# Patient Record
Sex: FEMALE | Race: BLACK OR AFRICAN AMERICAN | NOT HISPANIC OR LATINO | Employment: FULL TIME | URBAN - METROPOLITAN AREA
[De-identification: names, ages, dates, MRNs, and addresses within clinical notes are randomized per-mention and may not be internally consistent; named-entity substitution may affect disease eponyms.]

---

## 2024-03-30 NOTE — PROGRESS NOTES
"GYN ANNUAL EXAM     Chief Complaint   Patient presents with    ngyn     AE and home pregnancy positive.Wants her first pap.       ADAMARIS Bowie is a 24 y.o. female who presents for annual well woman exam. She is a new patient to our practice.     OB History    No obstetric history on file.         SUBJECTIVE    MENSTRUAL & SEXUAL HEALTH  LMP: Patient's last menstrual period was 02/28/2024.  Currently sexually active: is  Sexual preference: men  Menses regularity:  LMP 04/28/2024  Menses length:  n/a    Dysmenorrhea: n/a  Cyclic symptoms: n/a  Current contraception: none, positive pregnancy test.   Last pap: n/a,  first pap  History of abnormal pap: no  History of STD: No  Family history of cancer: denies       Family history of breast cancer: no  Performs SBE: performs monthly.  Incontinence: no  Dyspareunia: no    History reviewed. No pertinent past medical history.    History reviewed. No pertinent surgical history.    No current outpatient medications on file.    No Known Allergies    Social History     Tobacco Use    Smoking status: Never   Vaping Use    Vaping status: Never Used   Substance Use Topics    Alcohol use: Never    Drug use: Never       History reviewed. No pertinent family history.    Review of Systems   Constitutional:  Negative for fatigue, unexpected weight gain and unexpected weight loss.   Gastrointestinal:  Negative for abdominal pain.   Genitourinary:  Negative for decreased libido, difficulty urinating, dyspareunia, dysuria, pelvic pain, pelvic pressure, urgency, urinary incontinence and vaginal discharge.   All other systems reviewed and are negative.      OBJECTIVE    /73   Ht 170.2 cm (67\")   Wt 101 kg (223 lb)   LMP 02/28/2024   BMI 34.93 kg/m²     Physical Exam  Constitutional:       General: She is awake. She is not in acute distress.     Appearance: Normal appearance. She is well-developed and well-groomed. She is not ill-appearing.   Genitourinary:      Vulva, bladder and " urethral meatus normal.      Right Labia: No rash, tenderness, lesions or skin changes.     Left Labia: No tenderness, lesions, skin changes or rash.     No labial fusion noted.      No inguinal adenopathy present in the right or left side.     No vaginal discharge, erythema, tenderness, bleeding, ulceration or granulation tissue.      No vaginal prolapse present.     No vaginal atrophy present.     No cervical discharge, friability, lesion, polyp, eversion or elongation.      Uterus is not enlarged, tender or prolapsed.      Pelvic exam was performed with patient in the lithotomy position.   Breasts:     Breasts are symmetrical.      Breasts are soft.     Right: Normal.      Left: Normal.   HENT:      Head: Normocephalic and atraumatic.   Eyes:      General: No scleral icterus.     Conjunctiva/sclera: Conjunctivae normal.   Cardiovascular:      Rate and Rhythm: Normal rate and regular rhythm.      Heart sounds: Normal heart sounds.   Pulmonary:      Effort: Pulmonary effort is normal.      Breath sounds: Normal breath sounds.   Abdominal:      Palpations: Abdomen is soft.      Hernia: There is no hernia in the left inguinal area or right inguinal area.   Musculoskeletal:         General: Normal range of motion.      Cervical back: Normal range of motion.   Lymphadenopathy:      Lower Body: No right inguinal adenopathy. No left inguinal adenopathy.   Neurological:      Mental Status: She is alert.   Skin:     General: Skin is warm and dry.      Coloration: Skin is not jaundiced or pale.   Psychiatric:         Behavior: Behavior normal. Behavior is cooperative.   Vitals reviewed.         ASSESSMENT     Diagnoses and all orders for this visit:    1. Encounter for gynecological examination without abnormal finding (Primary)  -     IGP,CtNgTv,rfx Apt HPV All    2. Encounter for screening examination for sexually transmitted disease  -     IGP,CtNgTv,rfx Apt HPV All  -     NuSwab VG+ - Swab, Vagina  -     Hepatitis B  Surface Antigen  -     Hepatitis C Antibody  -     HIV-1 / O / 2 Ag / Antibody  -     T Pallidum Antibody w/ reflex RPR (Syphilis)    3. Positive pregnancy test  -     HCG, B-subunit, Quantitative  -     Progesterone         PLAN   WELL WOMAN EXAM: Pap smear collected today. Recommend MVI daily.    CONTRACEPTION:  positive pregnancy test.    POSITIVE PREGNANCY TEST: HCG & progesterone labs entered - return to care when applicable for dating ultrasound.   STD: STD screen today- desires - Serum and NuSwab., encouraged use of condoms.  SMOKING STATUS: non smoker.  BREAST HEALTH: Encouraged annual mammogram screening starting at age 40. Instructed on how to perform SBE. Encouraged breast health self awareness.  EXERCISE: Encouraged 150 minutes of exercise per week if not medially contraindicated.   BMI: Body mass index is 34.93 kg/m².     Return in about 4 weeks (around 4/29/2024) for first OB visit.    I spent 15 minutes caring for [place] on this date of service. This time includes time spent by me in the following activities: preparing for the visit, reviewing tests, obtaining and/or reviewing a separately obtained history, performing a medically appropriate examination and/or evaluation, counseling and educating the patient/family/caregiver, ordering medications, tests, or procedures, referring and communicating with other health care professionals, documenting information in the medical record, independently interpreting results and communicating that information with the patient/family/caregiver, and care coordination.    Shameka Mays CNM  4/1/2024  12:01 EDT

## 2024-04-01 ENCOUNTER — OFFICE VISIT (OUTPATIENT)
Dept: OBSTETRICS AND GYNECOLOGY | Facility: CLINIC | Age: 24
End: 2024-04-01
Payer: COMMERCIAL

## 2024-04-01 VITALS
BODY MASS INDEX: 35 KG/M2 | DIASTOLIC BLOOD PRESSURE: 73 MMHG | HEIGHT: 67 IN | WEIGHT: 223 LBS | SYSTOLIC BLOOD PRESSURE: 125 MMHG

## 2024-04-01 DIAGNOSIS — Z32.01 POSITIVE PREGNANCY TEST: ICD-10-CM

## 2024-04-01 DIAGNOSIS — Z01.419 ENCOUNTER FOR GYNECOLOGICAL EXAMINATION WITHOUT ABNORMAL FINDING: Primary | ICD-10-CM

## 2024-04-01 DIAGNOSIS — Z11.3 ENCOUNTER FOR SCREENING EXAMINATION FOR SEXUALLY TRANSMITTED DISEASE: ICD-10-CM

## 2024-04-01 PROCEDURE — 99385 PREV VISIT NEW AGE 18-39: CPT

## 2024-04-01 RX ORDER — PRENATAL VIT/IRON FUM/FOLIC AC 27 MG-1 MG
1 TABLET ORAL DAILY
Qty: 30 EACH | Refills: 10 | Status: SHIPPED | OUTPATIENT
Start: 2024-04-01

## 2024-04-02 ENCOUNTER — TELEPHONE (OUTPATIENT)
Dept: OBSTETRICS AND GYNECOLOGY | Facility: CLINIC | Age: 24
End: 2024-04-02
Payer: COMMERCIAL

## 2024-04-02 LAB
HBV SURFACE AG SERPL QL IA: NEGATIVE
HCG INTACT+B SERPL-ACNC: 9875 MIU/ML
HCV IGG SERPL QL IA: NON REACTIVE
HIV 1+2 AB+HIV1 P24 AG SERPL QL IA: NON REACTIVE
PROGEST SERPL-MCNC: 15.9 NG/ML
T PALLIDUM AB SER QL IF: NON REACTIVE

## 2024-04-02 NOTE — TELEPHONE ENCOUNTER
Shameka,    Patient requesting a phone call to further discuss her test results from yesterday.    Thanks!

## 2024-04-03 ENCOUNTER — TELEPHONE (OUTPATIENT)
Dept: OBSTETRICS AND GYNECOLOGY | Facility: CLINIC | Age: 24
End: 2024-04-03
Payer: COMMERCIAL

## 2024-04-03 LAB
A VAGINAE DNA VAG QL NAA+PROBE: NORMAL SCORE
BVAB2 DNA VAG QL NAA+PROBE: NORMAL SCORE
C ALBICANS DNA VAG QL NAA+PROBE: NEGATIVE
C GLABRATA DNA VAG QL NAA+PROBE: NEGATIVE
C TRACH DNA VAG QL NAA+PROBE: NEGATIVE
MEGA1 DNA VAG QL NAA+PROBE: NORMAL SCORE
N GONORRHOEA DNA VAG QL NAA+PROBE: NEGATIVE
T VAGINALIS DNA VAG QL NAA+PROBE: NEGATIVE

## 2024-04-03 NOTE — TELEPHONE ENCOUNTER
New Ob called to report she SW you yesterday via [employer], and you advised her to call the office to schedule u/s and visit with you.  I do not see documentation to support that, and no u/s ordered.     Pt is scheduled for New Ob visit w/Dr. Verma on 4/29/24.    Please advise.     Thanks,   Mansi

## 2024-04-04 LAB
C TRACH RRNA CVX QL NAA+PROBE: NEGATIVE
CONV .: NORMAL
CYTOLOGIST CVX/VAG CYTO: NORMAL
CYTOLOGY CVX/VAG DOC CYTO: NORMAL
CYTOLOGY CVX/VAG DOC THIN PREP: NORMAL
DX ICD CODE: NORMAL
Lab: NORMAL
N GONORRHOEA RRNA CVX QL NAA+PROBE: NEGATIVE
OTHER STN SPEC: NORMAL
STAT OF ADQ CVX/VAG CYTO-IMP: NORMAL
T VAGINALIS RRNA SPEC QL NAA+PROBE: NEGATIVE

## 2024-04-08 ENCOUNTER — PATIENT MESSAGE (OUTPATIENT)
Dept: OBSTETRICS AND GYNECOLOGY | Facility: CLINIC | Age: 24
End: 2024-04-08
Payer: COMMERCIAL

## 2024-04-08 ENCOUNTER — PATIENT ROUNDING (BHMG ONLY) (OUTPATIENT)
Dept: OBSTETRICS AND GYNECOLOGY | Facility: CLINIC | Age: 24
End: 2024-04-08
Payer: COMMERCIAL

## 2024-04-08 NOTE — PROGRESS NOTES
My chart message has been sent to the patient for PATIENT ROUNDING with [facility].

## 2024-04-29 ENCOUNTER — INITIAL PRENATAL (OUTPATIENT)
Dept: OBSTETRICS AND GYNECOLOGY | Facility: CLINIC | Age: 24
End: 2024-04-29
Payer: COMMERCIAL

## 2024-04-29 VITALS — BODY MASS INDEX: 34.17 KG/M2 | SYSTOLIC BLOOD PRESSURE: 120 MMHG | WEIGHT: 218.2 LBS | DIASTOLIC BLOOD PRESSURE: 77 MMHG

## 2024-04-29 DIAGNOSIS — Z34.90 PREGNANCY WITH UNCERTAIN DATES, ANTEPARTUM: ICD-10-CM

## 2024-04-29 DIAGNOSIS — Z13.89 SCREENING FOR BLOOD OR PROTEIN IN URINE: ICD-10-CM

## 2024-04-29 DIAGNOSIS — Z34.90 EARLY STAGE OF PREGNANCY: Primary | ICD-10-CM

## 2024-04-29 LAB
GLUCOSE UR STRIP-MCNC: NEGATIVE MG/DL
PROT UR STRIP-MCNC: NEGATIVE MG/DL

## 2024-04-29 PROCEDURE — 0501F PRENATAL FLOW SHEET: CPT | Performed by: OBSTETRICS & GYNECOLOGY

## 2024-04-29 NOTE — PROGRESS NOTES
Chief Complaint   Patient presents with    Initial Prenatal Visit     HPI- Pt is 24 y.o.  at 8w5d here for prenatal visit.  Patient presents for initial OB visit and states she is sure regarding her LMP.  She has no major underlying medical problems and is taking prenatal vitamins.  Her only complaint today is that she has had dizziness that is noticeable after standing for long periods of time.  She is only had 1 episode of vomiting.    ROS-     - No vaginal bleeding    GI- No abdominal pain    /77   Wt 99 kg (218 lb 3.2 oz)   LMP 2024   BMI 34.17 kg/m²   Exam - See flow sheet    Fetal heart rate is normal    Assessment-  Diagnoses and all orders for this visit:    Early stage of pregnancy  -     OB Panel With HIV  -     Urine Culture - Urine, Urine, Clean Catch  -     Hemoglobinopathy Fractionation [place]    Pregnancy with uncertain dates, antepartum  -     US Ob Transvaginal; Future    Discussed physiological changes that occur in pregnancy that contribute to dizziness.  Advised to maintain hydration and also discussed compression socks.  Patient had a normal Pap smear less than 1 month ago in our office.  OB labs and ultrasound were ordered.  I discussed delivering hospital and timing of prenatal visits.  She will follow-up in 4 weeks.

## 2024-04-30 ENCOUNTER — TELEPHONE (OUTPATIENT)
Dept: OBSTETRICS AND GYNECOLOGY | Facility: CLINIC | Age: 24
End: 2024-04-30
Payer: COMMERCIAL

## 2024-04-30 PROBLEM — O99.019 MATERNAL ANEMIA IN PREGNANCY, ANTEPARTUM: Status: ACTIVE | Noted: 2024-04-30

## 2024-04-30 LAB
ABO GROUP BLD: ABNORMAL
BASOPHILS # BLD AUTO: 0 X10E3/UL (ref 0–0.2)
BASOPHILS NFR BLD AUTO: 0 %
BLD GP AB SCN SERPL QL: NEGATIVE
EOSINOPHIL # BLD AUTO: 0.1 X10E3/UL (ref 0–0.4)
EOSINOPHIL NFR BLD AUTO: 1 %
ERYTHROCYTE [DISTWIDTH] IN BLOOD BY AUTOMATED COUNT: 16.4 % (ref 11.7–15.4)
HBV SURFACE AG SERPL QL IA: NEGATIVE
HCT VFR BLD AUTO: 34.5 % (ref 34–46.6)
HCV IGG SERPL QL IA: NON REACTIVE
HGB A MFR BLD ELPH: 98 % (ref 96.4–98.8)
HGB A2 MFR BLD ELPH: 2 % (ref 1.8–3.2)
HGB BLD-MCNC: 10.4 G/DL (ref 11.1–15.9)
HGB F MFR BLD ELPH: 0 % (ref 0–2)
HGB FRACT BLD-IMP: NORMAL
HGB S MFR BLD ELPH: 0 %
HIV 1+2 AB+HIV1 P24 AG SERPL QL IA: NON REACTIVE
IMM GRANULOCYTES # BLD AUTO: 0 X10E3/UL (ref 0–0.1)
IMM GRANULOCYTES NFR BLD AUTO: 0 %
LYMPHOCYTES # BLD AUTO: 2.2 X10E3/UL (ref 0.7–3.1)
LYMPHOCYTES NFR BLD AUTO: 20 %
MCH RBC QN AUTO: 22.7 PG (ref 26.6–33)
MCHC RBC AUTO-ENTMCNC: 30.1 G/DL (ref 31.5–35.7)
MCV RBC AUTO: 75 FL (ref 79–97)
MONOCYTES # BLD AUTO: 0.6 X10E3/UL (ref 0.1–0.9)
MONOCYTES NFR BLD AUTO: 5 %
NEUTROPHILS # BLD AUTO: 8 X10E3/UL (ref 1.4–7)
NEUTROPHILS NFR BLD AUTO: 74 %
PLATELET # BLD AUTO: 303 X10E3/UL (ref 150–450)
RBC # BLD AUTO: 4.58 X10E6/UL (ref 3.77–5.28)
RH BLD: POSITIVE
RPR SER QL: NON REACTIVE
RUBV IGG SERPL IA-ACNC: 2.56 INDEX
WBC # BLD AUTO: 10.9 X10E3/UL (ref 3.4–10.8)

## 2024-04-30 RX ORDER — FERROUS SULFATE 325(65) MG
325 TABLET ORAL
Qty: 90 TABLET | Refills: 1 | Status: SHIPPED | OUTPATIENT
Start: 2024-04-30

## 2024-04-30 NOTE — TELEPHONE ENCOUNTER
----- Message from Molina CHAMBERLAIN sent at 4/30/2024  9:42 AM EDT -----  Let patient know that her CBC does show anemia and I have sent a prescription for iron to her pharmacy.

## 2024-05-01 LAB
BACTERIA UR CULT: NORMAL
BACTERIA UR CULT: NORMAL

## 2024-05-13 DIAGNOSIS — Z36.0 ENCOUNTER FOR ANTENATAL SCREENING FOR CHROMOSOMAL ANOMALIES: Primary | ICD-10-CM

## 2024-05-18 LAB
CFDNA.FET/CFDNA.TOTAL SFR FETUS: NORMAL %
CITATION REF LAB TEST: NORMAL
FET 13+18+21+X+Y ANEUP PLAS.CFDNA: NEGATIVE
FET CHR 21 TS PLAS.CFDNA QL: NEGATIVE
FET SEX PLAS.CFDNA DOSAGE CFDNA: NORMAL
FET TS 13 RISK PLAS.CFDNA QL: NEGATIVE
FET TS 18 RISK WBC.DNA+CFDNA QL: NEGATIVE
GA EST FROM CONCEPTION DATE: NORMAL D
GESTATIONAL AGE > 9:: YES
LAB DIRECTOR NAME PROVIDER: NORMAL
LAB DIRECTOR NAME PROVIDER: NORMAL
LABORATORY COMMENT REPORT: NORMAL
LIMITATIONS OF THE TEST: NORMAL
NEGATIVE PREDICTIVE VALUE: NORMAL
NOTE: NORMAL
PERFORMANCE CHARACTERISTICS: NORMAL
POSITIVE PREDICTIVE VALUE: NORMAL
REF LAB TEST METHOD: NORMAL
TEST PERFORMANCE INFO SPEC: NORMAL

## 2024-05-20 ENCOUNTER — TELEPHONE (OUTPATIENT)
Dept: OBSTETRICS AND GYNECOLOGY | Facility: CLINIC | Age: 24
End: 2024-05-20
Payer: COMMERCIAL

## 2024-05-20 NOTE — TELEPHONE ENCOUNTER
Left vm to call for results    Please let patient know that her genetic testing was normal, and if she wants to know gender, it showed a male fetus

## 2024-05-29 ENCOUNTER — ROUTINE PRENATAL (OUTPATIENT)
Dept: OBSTETRICS AND GYNECOLOGY | Facility: CLINIC | Age: 24
End: 2024-05-29
Payer: COMMERCIAL

## 2024-05-29 VITALS — BODY MASS INDEX: 33.67 KG/M2 | DIASTOLIC BLOOD PRESSURE: 72 MMHG | SYSTOLIC BLOOD PRESSURE: 113 MMHG | WEIGHT: 215 LBS

## 2024-05-29 DIAGNOSIS — Z3A.13 13 WEEKS GESTATION OF PREGNANCY: Primary | ICD-10-CM

## 2024-05-29 DIAGNOSIS — Z34.92 PRENATAL CARE IN SECOND TRIMESTER: ICD-10-CM

## 2024-05-29 DIAGNOSIS — O21.9 NAUSEA AND VOMITING DURING PREGNANCY: ICD-10-CM

## 2024-05-29 DIAGNOSIS — O99.019 MATERNAL ANEMIA IN PREGNANCY, ANTEPARTUM: ICD-10-CM

## 2024-05-29 LAB
GLUCOSE UR STRIP-MCNC: NEGATIVE MG/DL
PROT UR STRIP-MCNC: NEGATIVE MG/DL

## 2024-05-29 PROCEDURE — 0502F SUBSEQUENT PRENATAL CARE: CPT | Performed by: NURSE PRACTITIONER

## 2024-05-29 NOTE — PROGRESS NOTES
Chief Complaint   Patient presents with    Routine Prenatal Visit       OB follow up     Azeb Keller is a 24 y.o.  13w6d being seen today for her obstetrical visit.  Patient reports fatigue. She is still having some N&V, last emesis was one week ago. Fetal movement:  too early .    Review of Systems  Cramping/contractions: denies  Vaginal bleeding: denies  Fetal movement: too early    /72   Wt 97.5 kg (215 lb)   LMP 2024   BMI 33.67 kg/m²     Assessment/Plan    Diagnoses and all orders for this visit:    1. 13 weeks gestation of pregnancy (Primary)    2. Prenatal care in second trimester    3. Maternal anemia in pregnancy, antepartum    4. Nausea and vomiting during pregnancy        Reviewed quickening  Reviewed normal [id number] screening  Anemia: continue iron supplement  N&V: improving, weight stable. Declines medications at this time  Call if unable to keep fluids down >6 hours  Reviewed this stage of pregnancy  Problem list updated     Follow up in 4 week(s) with Dr. Verma    I spent 22 minutes caring for Azeb on this date of service. This time includes time spent by me in the following activities: preparing for the visit, reviewing tests, obtaining and/or reviewing a separately obtained history, performing a medically appropriate examination and/or evaluation, counseling and educating the patient/family/caregiver, ordering medications, tests, or procedures, referring and communicating with other health care professionals, documenting information in the medical record, and independently interpreting results and communicating that information with the patient/family/caregiver    Kymberly Feliciano, APRN  2024  10:55 EDT

## 2024-06-24 ENCOUNTER — ROUTINE PRENATAL (OUTPATIENT)
Dept: OBSTETRICS AND GYNECOLOGY | Facility: CLINIC | Age: 24
End: 2024-06-24
Payer: COMMERCIAL

## 2024-06-24 VITALS — DIASTOLIC BLOOD PRESSURE: 71 MMHG | BODY MASS INDEX: 33.52 KG/M2 | WEIGHT: 214 LBS | SYSTOLIC BLOOD PRESSURE: 118 MMHG

## 2024-06-24 DIAGNOSIS — Z36.89 ENCOUNTER FOR FETAL ANATOMIC SURVEY: ICD-10-CM

## 2024-06-24 DIAGNOSIS — O99.019 MATERNAL ANEMIA IN PREGNANCY, ANTEPARTUM: Primary | ICD-10-CM

## 2024-06-24 DIAGNOSIS — Z36.86 ENCOUNTER FOR ANTENATAL SCREENING FOR CERVICAL LENGTH: ICD-10-CM

## 2024-06-24 DIAGNOSIS — Z13.89 SCREENING FOR BLOOD OR PROTEIN IN URINE: ICD-10-CM

## 2024-06-24 LAB
GLUCOSE UR STRIP-MCNC: NEGATIVE MG/DL
PROT UR STRIP-MCNC: ABNORMAL MG/DL

## 2024-06-24 PROCEDURE — 0502F SUBSEQUENT PRENATAL CARE: CPT | Performed by: OBSTETRICS & GYNECOLOGY

## 2024-06-24 NOTE — PROGRESS NOTES
Chief Complaint   Patient presents with    Routine Prenatal Visit     HPI- Pt is 24 y.o.  at 17w4d here for prenatal visit.  She is denying any complaints today.    ROS-     - No vaginal bleeding    GI- No abdominal pain    /71   Wt 97.1 kg (214 lb)   LMP 2024   BMI 33.52 kg/m²   Exam - See flow sheet    Fetal heart rate is normal    Assessment-  Diagnoses and all orders for this visit:    Maternal anemia in pregnancy, antepartum    Screening for blood or protein in urine  -     POC Urinalysis Dipstick    Encounter for fetal anatomic survey  -     US Ob 14 + Weeks Single or First Gestation; Future    Encounter for  screening for cervical length  -     US Ob Transvaginal; Future      Patient was offered screening for spina bifida and declines.  She did have normal cell free DNA testing.  She will follow-up in 3 weeks with anatomy ultrasound.

## 2024-07-15 ENCOUNTER — ROUTINE PRENATAL (OUTPATIENT)
Dept: OBSTETRICS AND GYNECOLOGY | Facility: CLINIC | Age: 24
End: 2024-07-15
Payer: COMMERCIAL

## 2024-07-15 VITALS — DIASTOLIC BLOOD PRESSURE: 70 MMHG | WEIGHT: 218.6 LBS | BODY MASS INDEX: 34.24 KG/M2 | SYSTOLIC BLOOD PRESSURE: 116 MMHG

## 2024-07-15 DIAGNOSIS — Z3A.20 20 WEEKS GESTATION OF PREGNANCY: ICD-10-CM

## 2024-07-15 DIAGNOSIS — Z13.89 SCREENING FOR BLOOD OR PROTEIN IN URINE: ICD-10-CM

## 2024-07-15 DIAGNOSIS — O99.019 MATERNAL ANEMIA IN PREGNANCY, ANTEPARTUM: Primary | ICD-10-CM

## 2024-07-15 DIAGNOSIS — Z36.2 ENCOUNTER FOR FOLLOW-UP ULTRASOUND OF FETAL ANATOMY: ICD-10-CM

## 2024-07-15 LAB
GLUCOSE UR STRIP-MCNC: NEGATIVE MG/DL
PROT UR STRIP-MCNC: NEGATIVE MG/DL

## 2024-07-15 PROCEDURE — 0502F SUBSEQUENT PRENATAL CARE: CPT | Performed by: OBSTETRICS & GYNECOLOGY

## 2024-07-16 LAB
ERYTHROCYTE [DISTWIDTH] IN BLOOD BY AUTOMATED COUNT: 17.8 % (ref 12.3–15.4)
HCT VFR BLD AUTO: 34.1 % (ref 34–46.6)
HGB BLD-MCNC: 10.4 G/DL (ref 12–15.9)
MCH RBC QN AUTO: 25.2 PG (ref 26.6–33)
MCHC RBC AUTO-ENTMCNC: 30.5 G/DL (ref 31.5–35.7)
MCV RBC AUTO: 82.8 FL (ref 79–97)
PLATELET # BLD AUTO: 243 10*3/MM3 (ref 140–450)
RBC # BLD AUTO: 4.12 10*6/MM3 (ref 3.77–5.28)
WBC # BLD AUTO: 10.72 10*3/MM3 (ref 3.4–10.8)

## 2024-08-19 ENCOUNTER — ROUTINE PRENATAL (OUTPATIENT)
Dept: OBSTETRICS AND GYNECOLOGY | Facility: CLINIC | Age: 24
End: 2024-08-19
Payer: COMMERCIAL

## 2024-08-19 VITALS — BODY MASS INDEX: 36.18 KG/M2 | WEIGHT: 231 LBS | SYSTOLIC BLOOD PRESSURE: 130 MMHG | DIASTOLIC BLOOD PRESSURE: 77 MMHG

## 2024-08-19 DIAGNOSIS — Z11.3 SCREEN FOR STD (SEXUALLY TRANSMITTED DISEASE): ICD-10-CM

## 2024-08-19 DIAGNOSIS — Z13.1 SCREENING FOR DIABETES MELLITUS: ICD-10-CM

## 2024-08-19 DIAGNOSIS — O99.019 MATERNAL ANEMIA IN PREGNANCY, ANTEPARTUM: Primary | ICD-10-CM

## 2024-08-19 DIAGNOSIS — Z3A.25 25 WEEKS GESTATION OF PREGNANCY: ICD-10-CM

## 2024-08-19 PROCEDURE — 0502F SUBSEQUENT PRENATAL CARE: CPT | Performed by: OBSTETRICS & GYNECOLOGY

## 2024-08-19 NOTE — PROGRESS NOTES
Chief Complaint   Patient presents with    Routine Prenatal Visit     HPI- Pt is 24 y.o.  at 25w4d here for prenatal visit.  She is doing well with no major complaints and feels good fetal movement.     ROS-     - No vaginal bleeding    GI- No abdominal pain    /77   Wt 105 kg (231 lb)   LMP 2024   BMI 36.18 kg/m²   Exam - See flow sheet    Fetal heart rate is normal    Assessment-  Diagnoses and all orders for this visit:    Maternal anemia in pregnancy, antepartum  -     CBC (No Diff)    25 weeks gestation of pregnancy    Screening for diabetes mellitus  -     Gestational Screen 1 Hr ([facility])    Screen for STD (sexually transmitted disease)  -     Treponema pallidum AB w/Reflex RPR    Reviewed followup anatomy ultrasound.  Anatomy was normal.  Discussed glucose test with her next visit in 3 weeks.

## 2024-09-09 ENCOUNTER — ROUTINE PRENATAL (OUTPATIENT)
Dept: OBSTETRICS AND GYNECOLOGY | Facility: CLINIC | Age: 24
End: 2024-09-09
Payer: COMMERCIAL

## 2024-09-09 VITALS — BODY MASS INDEX: 36.9 KG/M2 | SYSTOLIC BLOOD PRESSURE: 126 MMHG | DIASTOLIC BLOOD PRESSURE: 75 MMHG | WEIGHT: 235.6 LBS

## 2024-09-09 DIAGNOSIS — Z3A.25 25 WEEKS GESTATION OF PREGNANCY: ICD-10-CM

## 2024-09-09 DIAGNOSIS — O99.019 MATERNAL ANEMIA IN PREGNANCY, ANTEPARTUM: Primary | ICD-10-CM

## 2024-09-09 PROCEDURE — 0502F SUBSEQUENT PRENATAL CARE: CPT | Performed by: OBSTETRICS & GYNECOLOGY

## 2024-09-09 NOTE — PROGRESS NOTES
Chief Complaint   Patient presents with    Routine Prenatal Visit     HPI- Pt is 24 y.o.  at 28w4d here for prenatal visit.  She is doing well and has no complaints.  She reports lots of fetal movement.    ROS-     - No vaginal bleeding    GI- No abdominal pain    /75   Wt 107 kg (235 lb 9.6 oz)   LMP 2024   BMI 36.90 kg/m²   Exam - See flow sheet    Fetal heart rate is normal    Assessment-  Diagnoses and all orders for this visit:    Maternal anemia in pregnancy, antepartum    25 weeks gestation of pregnancy    She was counseled on fetal kick counts in the third trimester.  She states she is unable to stay today to complete her 1 hour glucose test and will return another day this week.  She will follow-up with me in 2 weeks.

## 2024-09-14 LAB
ERYTHROCYTE [DISTWIDTH] IN BLOOD BY AUTOMATED COUNT: 14.4 % (ref 11.7–15.4)
GLUCOSE 1H P 50 G GLC PO SERPL-MCNC: 114 MG/DL (ref 70–139)
HCT VFR BLD AUTO: 35.9 % (ref 34–46.6)
HGB BLD-MCNC: 11.3 G/DL (ref 11.1–15.9)
MCH RBC QN AUTO: 26.9 PG (ref 26.6–33)
MCHC RBC AUTO-ENTMCNC: 31.5 G/DL (ref 31.5–35.7)
MCV RBC AUTO: 86 FL (ref 79–97)
PLATELET # BLD AUTO: 219 X10E3/UL (ref 150–450)
RBC # BLD AUTO: 4.2 X10E6/UL (ref 3.77–5.28)
WBC # BLD AUTO: 10.4 X10E3/UL (ref 3.4–10.8)

## 2024-09-16 LAB — TREPONEMA PALLIDUM IGG+IGM AB [PRESENCE] IN SERUM OR PLASMA BY IMMUNOASSAY: NON REACTIVE

## 2024-09-23 ENCOUNTER — ROUTINE PRENATAL (OUTPATIENT)
Dept: OBSTETRICS AND GYNECOLOGY | Facility: CLINIC | Age: 24
End: 2024-09-23
Payer: COMMERCIAL

## 2024-09-23 ENCOUNTER — DOCUMENTATION (OUTPATIENT)
Dept: OBSTETRICS AND GYNECOLOGY | Facility: CLINIC | Age: 24
End: 2024-09-23

## 2024-09-23 VITALS — WEIGHT: 238.5 LBS | DIASTOLIC BLOOD PRESSURE: 73 MMHG | SYSTOLIC BLOOD PRESSURE: 120 MMHG | BODY MASS INDEX: 37.35 KG/M2

## 2024-09-23 DIAGNOSIS — Z13.89 SCREENING FOR BLOOD OR PROTEIN IN URINE: ICD-10-CM

## 2024-09-23 DIAGNOSIS — Z23 NEED FOR TDAP VACCINATION: ICD-10-CM

## 2024-09-23 DIAGNOSIS — Z29.11 NEED FOR RSV IMMUNIZATION: ICD-10-CM

## 2024-09-23 DIAGNOSIS — Z23 INFLUENZA VACCINATION GIVEN: ICD-10-CM

## 2024-09-23 DIAGNOSIS — O99.019 MATERNAL ANEMIA IN PREGNANCY, ANTEPARTUM: Primary | ICD-10-CM

## 2024-09-23 DIAGNOSIS — Z3A.30 30 WEEKS GESTATION OF PREGNANCY: ICD-10-CM

## 2024-09-23 LAB
GLUCOSE UR STRIP-MCNC: NEGATIVE MG/DL
PROT UR STRIP-MCNC: NEGATIVE MG/DL

## 2024-09-23 PROCEDURE — 90472 IMMUNIZATION ADMIN EACH ADD: CPT | Performed by: OBSTETRICS & GYNECOLOGY

## 2024-09-23 PROCEDURE — 0502F SUBSEQUENT PRENATAL CARE: CPT | Performed by: OBSTETRICS & GYNECOLOGY

## 2024-09-23 PROCEDURE — 90471 IMMUNIZATION ADMIN: CPT | Performed by: OBSTETRICS & GYNECOLOGY

## 2024-09-23 PROCEDURE — 90678 RSV VACC PREF BIVALENT IM: CPT | Performed by: OBSTETRICS & GYNECOLOGY

## 2024-09-23 PROCEDURE — 90656 IIV3 VACC NO PRSV 0.5 ML IM: CPT | Performed by: OBSTETRICS & GYNECOLOGY

## 2024-10-07 ENCOUNTER — ROUTINE PRENATAL (OUTPATIENT)
Dept: OBSTETRICS AND GYNECOLOGY | Facility: CLINIC | Age: 24
End: 2024-10-07
Payer: COMMERCIAL

## 2024-10-07 VITALS — DIASTOLIC BLOOD PRESSURE: 79 MMHG | BODY MASS INDEX: 37.15 KG/M2 | WEIGHT: 237.2 LBS | SYSTOLIC BLOOD PRESSURE: 139 MMHG

## 2024-10-07 DIAGNOSIS — Z3A.32 32 WEEKS GESTATION OF PREGNANCY: ICD-10-CM

## 2024-10-07 DIAGNOSIS — O99.019 MATERNAL ANEMIA IN PREGNANCY, ANTEPARTUM: Primary | ICD-10-CM

## 2024-10-07 DIAGNOSIS — Z23 INFLUENZA VACCINATION GIVEN: ICD-10-CM

## 2024-10-07 DIAGNOSIS — Z13.89 SCREENING FOR BLOOD OR PROTEIN IN URINE: ICD-10-CM

## 2024-10-07 DIAGNOSIS — N89.8 VAGINAL IRRITATION: ICD-10-CM

## 2024-10-07 LAB
GLUCOSE UR STRIP-MCNC: NEGATIVE MG/DL
PROT UR STRIP-MCNC: ABNORMAL MG/DL

## 2024-10-07 PROCEDURE — 0502F SUBSEQUENT PRENATAL CARE: CPT | Performed by: OBSTETRICS & GYNECOLOGY

## 2024-10-07 NOTE — PROGRESS NOTES
Chief Complaint   Patient presents with    Routine Prenatal Visit     HPI- Pt is 24 y.o.  at 32w4d here for prenatal visit.  Patient reports vaginal irritation that she notices only after intercourse.  She does report good fetal movement.    ROS-     - No vaginal bleeding    GI- No abdominal pain    /79   Wt 108 kg (237 lb 3.2 oz)   LMP 2024   BMI 37.15 kg/m²   Exam - See flow sheet    Fetal heart rate is normal    Assessment-  Diagnoses and all orders for this visit:    Maternal anemia in pregnancy, antepartum    Screening for blood or protein in urine  -     POC Urinalysis Dipstick    Vaginal irritation  -     NuSwab VG+ - Swab, Vagina    32 weeks gestation of pregnancy    Influenza vaccination given    I was notified by medical assistant that the patient needed flu vaccine at this visit after she was mistakenly given RSV vaccine at the last visit instead of the flu vaccine.  After the medical assistant gave the flu vaccine, it was noted in her chart that she actually did receive the flu vaccine and RSV vaccine at the last visit.  Patient was made aware of this mistake and she will receive Tdap at her next visit in 2 weeks.  Reviewed growth ultrasound with her and ultrasound shows appropriate fetal growth.  She will follow-up in 2 weeks.

## 2024-10-08 LAB
A VAGINAE DNA VAG QL NAA+PROBE: NORMAL SCORE
BVAB2 DNA VAG QL NAA+PROBE: NORMAL SCORE
C ALBICANS DNA VAG QL NAA+PROBE: NEGATIVE
C GLABRATA DNA VAG QL NAA+PROBE: NEGATIVE
C TRACH DNA SPEC QL NAA+PROBE: NEGATIVE
MEGA1 DNA VAG QL NAA+PROBE: NORMAL SCORE
N GONORRHOEA DNA VAG QL NAA+PROBE: NEGATIVE
T VAGINALIS DNA VAG QL NAA+PROBE: NEGATIVE

## 2024-10-10 NOTE — PROGRESS NOTES
Chief Complaint   Patient presents with    Routine Prenatal Visit     HPI- Pt is 24 y.o.  at 20w4d here for prenatal visit.  She is doing well and has no major complaints.  She is feeling fetal movement.  She would like to have her CBC repeated today to see if she could stop the ferrous sulfate for a period of time.    ROS-     - No vaginal bleeding    GI- No abdominal pain    /70   Wt 99.2 kg (218 lb 9.6 oz)   LMP 2024   BMI 34.24 kg/m²   Exam - See flow sheet    Fetal heart rate is normal    Assessment-  Diagnoses and all orders for this visit:    Maternal anemia in pregnancy, antepartum  -     CBC (No Diff)    Encounter for follow-up ultrasound of fetal anatomy  -     US Ob Follow Up Transabdominal Approach; Future    20 weeks gestation of pregnancy    We will repeat CBC today.  Anatomy ultrasound was reviewed with her and fetal face could not be well-visualized.  I discussed recommendations to repeat ultrasound in 4 weeks, but patient desires to return in 2 weeks.  She will follow-up in 2 weeks for ultrasound and see me back in 4 weeks.       10-Oct-2024 15:47

## 2024-10-21 ENCOUNTER — ROUTINE PRENATAL (OUTPATIENT)
Dept: OBSTETRICS AND GYNECOLOGY | Facility: CLINIC | Age: 24
End: 2024-10-21
Payer: COMMERCIAL

## 2024-10-21 VITALS — BODY MASS INDEX: 38.31 KG/M2 | DIASTOLIC BLOOD PRESSURE: 80 MMHG | SYSTOLIC BLOOD PRESSURE: 132 MMHG | WEIGHT: 244.6 LBS

## 2024-10-21 DIAGNOSIS — O99.019 MATERNAL ANEMIA IN PREGNANCY, ANTEPARTUM: ICD-10-CM

## 2024-10-21 DIAGNOSIS — O36.8130 DECREASED FETAL MOVEMENTS IN THIRD TRIMESTER, SINGLE OR UNSPECIFIED FETUS: Primary | ICD-10-CM

## 2024-10-21 DIAGNOSIS — Z13.89 SCREENING FOR BLOOD OR PROTEIN IN URINE: ICD-10-CM

## 2024-10-21 DIAGNOSIS — Z3A.34 34 WEEKS GESTATION OF PREGNANCY: ICD-10-CM

## 2024-10-21 DIAGNOSIS — Z23 NEED FOR TDAP VACCINATION: ICD-10-CM

## 2024-10-21 LAB
GLUCOSE UR STRIP-MCNC: NEGATIVE MG/DL
PROT UR STRIP-MCNC: ABNORMAL MG/DL

## 2024-10-21 PROCEDURE — 0502F SUBSEQUENT PRENATAL CARE: CPT | Performed by: OBSTETRICS & GYNECOLOGY

## 2024-10-21 PROCEDURE — 90715 TDAP VACCINE 7 YRS/> IM: CPT | Performed by: OBSTETRICS & GYNECOLOGY

## 2024-10-21 PROCEDURE — 90471 IMMUNIZATION ADMIN: CPT | Performed by: OBSTETRICS & GYNECOLOGY

## 2024-10-21 NOTE — PROGRESS NOTES
Chief Complaint   Patient presents with    Routine Prenatal Visit     HPI- Pt is 24 y.o.  at 34w4d here for prenatal visit.  Patient does feel that she is not feeling as much fetal movement as she has been and is unsure if she is feeling 10 movements over 2 hours.  She does report more pelvic pressure.    ROS-     - No vaginal bleeding    GI- No abdominal pain    /80   Wt 111 kg (244 lb 9.6 oz)   LMP 2024   BMI 38.31 kg/m²   Exam - See flow sheet    Fetal heart rate is normal    Assessment-  Diagnoses and all orders for this visit:    Decreased fetal movements in third trimester, single or unspecified fetus  -     US Fetal Biophysical Profile;Without Non-Stress Testing; Future    Need for Tdap vaccination    Maternal anemia in pregnancy, antepartum    34 weeks gestation of pregnancy    She will receive the Tdap vaccine today.  We will check a BPP due to complaints of decreased fetal movement, but I was able to see a lot of active fetal movement while listening to fetal heart tones.  She will follow-up in 2 weeks and discussed cervical check with that visit.

## 2024-11-06 ENCOUNTER — ROUTINE PRENATAL (OUTPATIENT)
Dept: OBSTETRICS AND GYNECOLOGY | Facility: CLINIC | Age: 24
End: 2024-11-06
Payer: COMMERCIAL

## 2024-11-06 VITALS — BODY MASS INDEX: 38.69 KG/M2 | WEIGHT: 247 LBS | DIASTOLIC BLOOD PRESSURE: 83 MMHG | SYSTOLIC BLOOD PRESSURE: 130 MMHG

## 2024-11-06 DIAGNOSIS — O99.019 MATERNAL ANEMIA IN PREGNANCY, ANTEPARTUM: Primary | ICD-10-CM

## 2024-11-06 DIAGNOSIS — Z13.89 SCREENING FOR BLOOD OR PROTEIN IN URINE: ICD-10-CM

## 2024-11-06 DIAGNOSIS — Z3A.36 36 WEEKS GESTATION OF PREGNANCY: ICD-10-CM

## 2024-11-06 DIAGNOSIS — Z36.85 ANTENATAL SCREENING FOR STREPTOCOCCUS B: ICD-10-CM

## 2024-11-06 LAB
GLUCOSE UR STRIP-MCNC: NEGATIVE MG/DL
PROT UR STRIP-MCNC: NEGATIVE MG/DL

## 2024-11-06 NOTE — PROGRESS NOTES
Chief Complaint   Patient presents with    Routine Prenatal Visit     HPI- Pt is 24 y.o.  at 36w6d here for prenatal visit.  She is overall doing well with no major complaints.  She does report good fetal movement.    ROS-     - No vaginal bleeding    GI- No abdominal pain    /83   Wt 112 kg (247 lb)   LMP 2024   BMI 38.69 kg/m²   Exam - See flow sheet    Fetal heart rate is normal    Assessment-  Diagnoses and all orders for this visit:    Maternal anemia in pregnancy, antepartum     screening for streptococcus B  -     Group B Streptococcus Culture - Swab, Vaginal/Rectum    36 weeks gestation of pregnancy    Discussed labor precautions and when to go to labor and delivery.  GBS culture was obtained.  She will follow-up weekly.

## 2024-11-10 LAB — B-HEM STREP SPEC QL CULT: NEGATIVE

## 2024-11-12 ENCOUNTER — ROUTINE PRENATAL (OUTPATIENT)
Dept: OBSTETRICS AND GYNECOLOGY | Facility: CLINIC | Age: 24
End: 2024-11-12
Payer: COMMERCIAL

## 2024-11-12 VITALS — DIASTOLIC BLOOD PRESSURE: 68 MMHG | WEIGHT: 250 LBS | SYSTOLIC BLOOD PRESSURE: 122 MMHG | BODY MASS INDEX: 39.16 KG/M2

## 2024-11-12 DIAGNOSIS — Z3A.37 37 WEEKS GESTATION OF PREGNANCY: ICD-10-CM

## 2024-11-12 DIAGNOSIS — O99.019 MATERNAL ANEMIA IN PREGNANCY, ANTEPARTUM: Primary | ICD-10-CM

## 2024-11-12 NOTE — PROGRESS NOTES
Chief Complaint   Patient presents with    Routine Prenatal Visit     HPI- Pt is 24 y.o.  at 37w5d here for prenatal visit.  She is doing well with no complaints.  She reports good fetal movement.  She denies any contractions or leaking of fluid.    ROS-     - No vaginal bleeding    GI- No abdominal pain    /68   Wt 113 kg (250 lb)   LMP 2024   BMI 39.16 kg/m²   Exam - See flow sheet    Fetal heart rate is normal    Assessment-  Diagnoses and all orders for this visit:    Maternal anemia in pregnancy, antepartum    37 weeks gestation of pregnancy    Reviewed negative GBS culture.  She is doing well with no issues.  She declined cervical check.  She will continue with weekly follow-up.

## 2024-11-20 ENCOUNTER — ROUTINE PRENATAL (OUTPATIENT)
Dept: OBSTETRICS AND GYNECOLOGY | Facility: CLINIC | Age: 24
End: 2024-11-20
Payer: COMMERCIAL

## 2024-11-20 VITALS — BODY MASS INDEX: 39.47 KG/M2 | WEIGHT: 252 LBS | SYSTOLIC BLOOD PRESSURE: 132 MMHG | DIASTOLIC BLOOD PRESSURE: 80 MMHG

## 2024-11-20 DIAGNOSIS — Z3A.38 38 WEEKS GESTATION OF PREGNANCY: ICD-10-CM

## 2024-11-20 DIAGNOSIS — O99.019 MATERNAL ANEMIA IN PREGNANCY, ANTEPARTUM: Primary | ICD-10-CM

## 2024-11-20 NOTE — PROGRESS NOTES
Chief Complaint   Patient presents with    Routine Prenatal Visit     HPI- Pt is 24 y.o.  at 38w6d here for prenatal visit.  She is doing well and has no complaints.  She denies any painful contractions    ROS-     - No vaginal bleeding    GI- No abdominal pain    /80   Wt 114 kg (252 lb)   LMP 2024   BMI 39.47 kg/m²   Exam - See flow sheet    Fetal heart rate is normal    Assessment-  Diagnoses and all orders for this visit:    Maternal anemia in pregnancy, antepartum    38 weeks gestation of pregnancy    Patient wishes to continue with expectant management and declined cervical check today.  We will check her cervix next week and if undelivered, we will discuss postdates induction.

## 2024-11-21 RX ORDER — FERROUS SULFATE 325(65) MG
1 TABLET ORAL
Qty: 90 TABLET | Refills: 1 | Status: SHIPPED | OUTPATIENT
Start: 2024-11-21

## 2024-11-27 ENCOUNTER — ROUTINE PRENATAL (OUTPATIENT)
Dept: OBSTETRICS AND GYNECOLOGY | Facility: CLINIC | Age: 24
End: 2024-11-27
Payer: COMMERCIAL

## 2024-11-27 ENCOUNTER — TELEPHONE (OUTPATIENT)
Dept: OBSTETRICS AND GYNECOLOGY | Facility: CLINIC | Age: 24
End: 2024-11-27

## 2024-11-27 VITALS — WEIGHT: 254 LBS | BODY MASS INDEX: 39.78 KG/M2 | DIASTOLIC BLOOD PRESSURE: 85 MMHG | SYSTOLIC BLOOD PRESSURE: 127 MMHG

## 2024-11-27 DIAGNOSIS — O36.8130 DECREASED FETAL MOVEMENTS IN THIRD TRIMESTER, SINGLE OR UNSPECIFIED FETUS: ICD-10-CM

## 2024-11-27 DIAGNOSIS — O48.0 POST-TERM PREGNANCY, 40-42 WEEKS OF GESTATION: ICD-10-CM

## 2024-11-27 DIAGNOSIS — O99.019 MATERNAL ANEMIA IN PREGNANCY, ANTEPARTUM: Primary | ICD-10-CM

## 2024-11-27 DIAGNOSIS — Z3A.39 39 WEEKS GESTATION OF PREGNANCY: ICD-10-CM

## 2024-11-27 DIAGNOSIS — Z13.89 SCREENING FOR BLOOD OR PROTEIN IN URINE: ICD-10-CM

## 2024-11-27 LAB
GLUCOSE UR STRIP-MCNC: NEGATIVE MG/DL
PROT UR STRIP-MCNC: NEGATIVE MG/DL

## 2024-11-27 NOTE — PROGRESS NOTES
Chief Complaint   Patient presents with    Routine Prenatal Visit     HPI- Pt is 24 y.o.  at 39w6d here for prenatal visit.  Patient does feel that baby's movements have slowed down.  She noticed a small amount of bleeding when wiping after urinating.  She denies any leaking of fluid.  She is denying having any contractions.    ROS-     - No vaginal bleeding    GI- No abdominal pain    /85   Wt 115 kg (254 lb)   LMP 2024   BMI 39.78 kg/m²   Exam - See flow sheet    Fetal heart rate is normal    Assessment-  Diagnoses and all orders for this visit:    Maternal anemia in pregnancy, antepartum  -     Labor Induction; Future    39 weeks gestation of pregnancy    Decreased fetal movements in third trimester, single or unspecified fetus  -     US Fetal Biophysical Profile;Without Non-Stress Testing; Future  -     US Ob Follow Up Transabdominal Approach; Future  -     Labor Induction; Future    Post-term pregnancy, 40-42 weeks of gestation  -     Labor Induction; Future    Growth ultrasound and BPP today were reassuring.  We will plan a Pitocin postdates induction next week and the induction process was explained to her.

## 2024-11-27 NOTE — TELEPHONE ENCOUNTER
Spoke with pt she is aware of induction details and to call Labor and Delivery at 5 am on 12-2 to make sure a bed is available. Went over process and explained if they have women in active labor and no beds available, they will have her come in at a later time. Provided pt with L&D phone number.     Tierra Mejía

## 2024-11-29 ENCOUNTER — ANESTHESIA EVENT (OUTPATIENT)
Dept: LABOR AND DELIVERY | Facility: HOSPITAL | Age: 24
End: 2024-11-29
Payer: COMMERCIAL

## 2024-11-29 ENCOUNTER — HOSPITAL ENCOUNTER (INPATIENT)
Facility: HOSPITAL | Age: 24
LOS: 2 days | Discharge: HOME | End: 2024-12-01
Attending: OBSTETRICS & GYNECOLOGY | Admitting: OBSTETRICS & GYNECOLOGY
Payer: COMMERCIAL

## 2024-11-29 ENCOUNTER — ANESTHESIA (OUTPATIENT)
Dept: LABOR AND DELIVERY | Facility: HOSPITAL | Age: 24
End: 2024-11-29
Payer: COMMERCIAL

## 2024-11-29 PROBLEM — Z34.90 PREGNANCY: Status: ACTIVE | Noted: 2024-11-29

## 2024-11-29 PROBLEM — Z34.90 PREGNANCY: Status: RESOLVED | Noted: 2024-11-29 | Resolved: 2024-11-29

## 2024-11-29 LAB
ABO GROUP BLD: NORMAL
ALBUMIN SERPL-MCNC: 3.4 G/DL (ref 3.5–5.2)
ALBUMIN/GLOB SERPL: 0.9 G/DL
ALP SERPL-CCNC: 165 U/L (ref 39–117)
ALT SERPL W P-5'-P-CCNC: 15 U/L (ref 1–33)
ANION GAP SERPL CALCULATED.3IONS-SCNC: 13 MMOL/L (ref 5–15)
AST SERPL-CCNC: 20 U/L (ref 1–32)
BASOPHILS # BLD AUTO: 0.04 10*3/MM3 (ref 0–0.2)
BASOPHILS NFR BLD AUTO: 0.3 % (ref 0–1.5)
BILIRUB SERPL-MCNC: <0.2 MG/DL (ref 0–1.2)
BLD GP AB SCN SERPL QL: NEGATIVE
BUN SERPL-MCNC: 10 MG/DL (ref 6–20)
BUN/CREAT SERPL: 15.6 (ref 7–25)
CALCIUM SPEC-SCNC: 9.6 MG/DL (ref 8.6–10.5)
CHLORIDE SERPL-SCNC: 104 MMOL/L (ref 98–107)
CO2 SERPL-SCNC: 18 MMOL/L (ref 22–29)
CREAT SERPL-MCNC: 0.64 MG/DL (ref 0.57–1)
DEPRECATED RDW RBC AUTO: 43.3 FL (ref 37–54)
EGFRCR SERPLBLD CKD-EPI 2021: 125.2 ML/MIN/1.73
EOSINOPHIL # BLD AUTO: 0.02 10*3/MM3 (ref 0–0.4)
EOSINOPHIL NFR BLD AUTO: 0.1 % (ref 0.3–6.2)
ERYTHROCYTE [DISTWIDTH] IN BLOOD BY AUTOMATED COUNT: 14.4 % (ref 12.3–15.4)
GLOBULIN UR ELPH-MCNC: 4 GM/DL
GLUCOSE SERPL-MCNC: 88 MG/DL (ref 65–99)
HCT VFR BLD AUTO: 38.5 % (ref 34–46.6)
HGB BLD-MCNC: 12.8 G/DL (ref 12–15.9)
IMM GRANULOCYTES # BLD AUTO: 0.11 10*3/MM3 (ref 0–0.05)
IMM GRANULOCYTES NFR BLD AUTO: 0.7 % (ref 0–0.5)
LYMPHOCYTES # BLD AUTO: 1.58 10*3/MM3 (ref 0.7–3.1)
LYMPHOCYTES NFR BLD AUTO: 10 % (ref 19.6–45.3)
MCH RBC QN AUTO: 27.8 PG (ref 26.6–33)
MCHC RBC AUTO-ENTMCNC: 33.2 G/DL (ref 31.5–35.7)
MCV RBC AUTO: 83.7 FL (ref 79–97)
MONOCYTES # BLD AUTO: 0.55 10*3/MM3 (ref 0.1–0.9)
MONOCYTES NFR BLD AUTO: 3.5 % (ref 5–12)
NEUTROPHILS NFR BLD AUTO: 13.46 10*3/MM3 (ref 1.7–7)
NEUTROPHILS NFR BLD AUTO: 85.4 % (ref 42.7–76)
NRBC BLD AUTO-RTO: 0 /100 WBC (ref 0–0.2)
PLATELET # BLD AUTO: 222 10*3/MM3 (ref 140–450)
PMV BLD AUTO: 11.2 FL (ref 6–12)
POTASSIUM SERPL-SCNC: 4 MMOL/L (ref 3.5–5.2)
PROT SERPL-MCNC: 7.4 G/DL (ref 6–8.5)
RBC # BLD AUTO: 4.6 10*6/MM3 (ref 3.77–5.28)
RH BLD: POSITIVE
SODIUM SERPL-SCNC: 135 MMOL/L (ref 136–145)
T&S EXPIRATION DATE: NORMAL
TREPONEMA PALLIDUM IGG+IGM AB [PRESENCE] IN SERUM OR PLASMA BY IMMUNOASSAY: NORMAL
WBC NRBC COR # BLD AUTO: 15.76 10*3/MM3 (ref 3.4–10.8)

## 2024-11-29 PROCEDURE — 25010000002 LIDOCAINE HCL (PF) 1.5 % SOLUTION: Performed by: ANESTHESIOLOGY

## 2024-11-29 PROCEDURE — 85025 COMPLETE CBC W/AUTO DIFF WBC: CPT | Performed by: STUDENT IN AN ORGANIZED HEALTH CARE EDUCATION/TRAINING PROGRAM

## 2024-11-29 PROCEDURE — 25010000002 METHYLERGONOVINE MALEATE PER 0.2 MG: Performed by: STUDENT IN AN ORGANIZED HEALTH CARE EDUCATION/TRAINING PROGRAM

## 2024-11-29 PROCEDURE — 0KQM0ZZ REPAIR PERINEUM MUSCLE, OPEN APPROACH: ICD-10-PCS | Performed by: OBSTETRICS & GYNECOLOGY

## 2024-11-29 PROCEDURE — 25010000002 LIDOCAINE-EPINEPHRINE (PF) 1.5 %-1:200000 SOLUTION: Performed by: ANESTHESIOLOGY

## 2024-11-29 PROCEDURE — 59400 OBSTETRICAL CARE: CPT | Performed by: OBSTETRICS & GYNECOLOGY

## 2024-11-29 PROCEDURE — 2Y44X5Z PACKING OF FEMALE GENITAL TRACT USING PACKING MATERIAL: ICD-10-PCS | Performed by: OBSTETRICS & GYNECOLOGY

## 2024-11-29 PROCEDURE — 86850 RBC ANTIBODY SCREEN: CPT | Performed by: STUDENT IN AN ORGANIZED HEALTH CARE EDUCATION/TRAINING PROGRAM

## 2024-11-29 PROCEDURE — 25010000002 OXYTOCIN PER 10 UNITS: Performed by: STUDENT IN AN ORGANIZED HEALTH CARE EDUCATION/TRAINING PROGRAM

## 2024-11-29 PROCEDURE — 10907ZC DRAINAGE OF AMNIOTIC FLUID, THERAPEUTIC FROM PRODUCTS OF CONCEPTION, VIA NATURAL OR ARTIFICIAL OPENING: ICD-10-PCS | Performed by: OBSTETRICS & GYNECOLOGY

## 2024-11-29 PROCEDURE — 25810000003 SODIUM CHLORIDE 0.9 % SOLUTION: Performed by: STUDENT IN AN ORGANIZED HEALTH CARE EDUCATION/TRAINING PROGRAM

## 2024-11-29 PROCEDURE — 25010000002 OXYTOCIN PER 10 UNITS: Performed by: OBSTETRICS & GYNECOLOGY

## 2024-11-29 PROCEDURE — 25810000003 SODIUM CHLORIDE 0.9 % SOLUTION: Performed by: OBSTETRICS & GYNECOLOGY

## 2024-11-29 PROCEDURE — 86780 TREPONEMA PALLIDUM: CPT | Performed by: STUDENT IN AN ORGANIZED HEALTH CARE EDUCATION/TRAINING PROGRAM

## 2024-11-29 PROCEDURE — 25810000003 LACTATED RINGERS PER 1000 ML: Performed by: STUDENT IN AN ORGANIZED HEALTH CARE EDUCATION/TRAINING PROGRAM

## 2024-11-29 PROCEDURE — 99202 OFFICE O/P NEW SF 15 MIN: CPT | Performed by: OBSTETRICS & GYNECOLOGY

## 2024-11-29 PROCEDURE — 86900 BLOOD TYPING SEROLOGIC ABO: CPT | Performed by: STUDENT IN AN ORGANIZED HEALTH CARE EDUCATION/TRAINING PROGRAM

## 2024-11-29 PROCEDURE — 59025 FETAL NON-STRESS TEST: CPT

## 2024-11-29 PROCEDURE — 80053 COMPREHEN METABOLIC PANEL: CPT | Performed by: STUDENT IN AN ORGANIZED HEALTH CARE EDUCATION/TRAINING PROGRAM

## 2024-11-29 PROCEDURE — 0UQG7ZZ REPAIR VAGINA, VIA NATURAL OR ARTIFICIAL OPENING: ICD-10-PCS | Performed by: OBSTETRICS & GYNECOLOGY

## 2024-11-29 PROCEDURE — C1755 CATHETER, INTRASPINAL: HCPCS | Performed by: ANESTHESIOLOGY

## 2024-11-29 PROCEDURE — 86901 BLOOD TYPING SEROLOGIC RH(D): CPT | Performed by: STUDENT IN AN ORGANIZED HEALTH CARE EDUCATION/TRAINING PROGRAM

## 2024-11-29 RX ORDER — SODIUM CHLORIDE 0.9 % (FLUSH) 0.9 %
10 SYRINGE (ML) INJECTION EVERY 12 HOURS SCHEDULED
Status: DISCONTINUED | OUTPATIENT
Start: 2024-11-29 | End: 2024-11-29

## 2024-11-29 RX ORDER — PRENATAL VIT/IRON FUM/FOLIC AC 27MG-0.8MG
1 TABLET ORAL DAILY
Status: DISCONTINUED | OUTPATIENT
Start: 2024-11-30 | End: 2024-12-01 | Stop reason: HOSPADM

## 2024-11-29 RX ORDER — OXYTOCIN/0.9 % SODIUM CHLORIDE 30/500 ML
999 PLASTIC BAG, INJECTION (ML) INTRAVENOUS ONCE
Status: DISCONTINUED | OUTPATIENT
Start: 2024-11-29 | End: 2024-11-29 | Stop reason: HOSPADM

## 2024-11-29 RX ORDER — OXYTOCIN/0.9 % SODIUM CHLORIDE 30/500 ML
2-20 PLASTIC BAG, INJECTION (ML) INTRAVENOUS
Status: DISCONTINUED | OUTPATIENT
Start: 2024-11-29 | End: 2024-11-29

## 2024-11-29 RX ORDER — IBUPROFEN 600 MG/1
600 TABLET, FILM COATED ORAL EVERY 6 HOURS PRN
Status: DISCONTINUED | OUTPATIENT
Start: 2024-11-29 | End: 2024-12-01 | Stop reason: HOSPADM

## 2024-11-29 RX ORDER — LIDOCAINE HYDROCHLORIDE 10 MG/ML
0.5 INJECTION, SOLUTION INFILTRATION; PERINEURAL ONCE AS NEEDED
Status: DISCONTINUED | OUTPATIENT
Start: 2024-11-29 | End: 2024-11-29

## 2024-11-29 RX ORDER — BUTORPHANOL TARTRATE 2 MG/ML
2 INJECTION, SOLUTION INTRAMUSCULAR; INTRAVENOUS
Status: DISCONTINUED | OUTPATIENT
Start: 2024-11-29 | End: 2024-11-29

## 2024-11-29 RX ORDER — TRANEXAMIC ACID 10 MG/ML
INJECTION, SOLUTION INTRAVENOUS
Status: ACTIVE
Start: 2024-11-29 | End: 2024-11-30

## 2024-11-29 RX ORDER — HYDROXYZINE HYDROCHLORIDE 50 MG/1
50 TABLET, FILM COATED ORAL NIGHTLY PRN
Status: DISCONTINUED | OUTPATIENT
Start: 2024-11-29 | End: 2024-12-01 | Stop reason: HOSPADM

## 2024-11-29 RX ORDER — FAMOTIDINE 10 MG/ML
20 INJECTION, SOLUTION INTRAVENOUS ONCE AS NEEDED
Status: DISCONTINUED | OUTPATIENT
Start: 2024-11-29 | End: 2024-11-29

## 2024-11-29 RX ORDER — LIDOCAINE HYDROCHLORIDE 15 MG/ML
INJECTION, SOLUTION EPIDURAL; INFILTRATION; INTRACAUDAL; PERINEURAL AS NEEDED
Status: DISCONTINUED | OUTPATIENT
Start: 2024-11-29 | End: 2024-11-29 | Stop reason: SURG

## 2024-11-29 RX ORDER — DOCUSATE SODIUM 100 MG/1
100 CAPSULE, LIQUID FILLED ORAL 2 TIMES DAILY
Status: DISCONTINUED | OUTPATIENT
Start: 2024-11-29 | End: 2024-12-01 | Stop reason: HOSPADM

## 2024-11-29 RX ORDER — FENTANYL/ROPIVACAINE/NS/PF 2MCG/ML-.2
8 PLASTIC BAG, INJECTION (ML) INJECTION CONTINUOUS
Status: DISCONTINUED | OUTPATIENT
Start: 2024-11-29 | End: 2024-11-29

## 2024-11-29 RX ORDER — ONDANSETRON 4 MG/1
4 TABLET, ORALLY DISINTEGRATING ORAL EVERY 8 HOURS PRN
Status: DISCONTINUED | OUTPATIENT
Start: 2024-11-29 | End: 2024-12-01 | Stop reason: HOSPADM

## 2024-11-29 RX ORDER — ONDANSETRON 4 MG/1
4 TABLET, ORALLY DISINTEGRATING ORAL EVERY 6 HOURS PRN
Status: DISCONTINUED | OUTPATIENT
Start: 2024-11-29 | End: 2024-11-29

## 2024-11-29 RX ORDER — TRAMADOL HYDROCHLORIDE 50 MG/1
50 TABLET ORAL EVERY 6 HOURS PRN
Status: DISCONTINUED | OUTPATIENT
Start: 2024-11-29 | End: 2024-12-01 | Stop reason: HOSPADM

## 2024-11-29 RX ORDER — FAMOTIDINE 10 MG/ML
20 INJECTION, SOLUTION INTRAVENOUS 2 TIMES DAILY PRN
Status: DISCONTINUED | OUTPATIENT
Start: 2024-11-29 | End: 2024-11-29

## 2024-11-29 RX ORDER — ACETAMINOPHEN 325 MG/1
650 TABLET ORAL EVERY 6 HOURS PRN
Status: DISCONTINUED | OUTPATIENT
Start: 2024-11-29 | End: 2024-12-01 | Stop reason: HOSPADM

## 2024-11-29 RX ORDER — METHYLERGONOVINE MALEATE 0.2 MG/ML
200 INJECTION INTRAVENOUS ONCE AS NEEDED
Status: COMPLETED | OUTPATIENT
Start: 2024-11-29 | End: 2024-11-29

## 2024-11-29 RX ORDER — SODIUM CHLORIDE, SODIUM LACTATE, POTASSIUM CHLORIDE, CALCIUM CHLORIDE 600; 310; 30; 20 MG/100ML; MG/100ML; MG/100ML; MG/100ML
125 INJECTION, SOLUTION INTRAVENOUS CONTINUOUS
Status: DISCONTINUED | OUTPATIENT
Start: 2024-11-29 | End: 2024-11-29

## 2024-11-29 RX ORDER — SODIUM CHLORIDE 9 MG/ML
40 INJECTION, SOLUTION INTRAVENOUS AS NEEDED
Status: DISCONTINUED | OUTPATIENT
Start: 2024-11-29 | End: 2024-11-29

## 2024-11-29 RX ORDER — MISOPROSTOL 200 UG/1
800 TABLET ORAL ONCE AS NEEDED
Status: DISCONTINUED | OUTPATIENT
Start: 2024-11-29 | End: 2024-11-29

## 2024-11-29 RX ORDER — ACETAMINOPHEN 325 MG/1
650 TABLET ORAL EVERY 4 HOURS PRN
Status: DISCONTINUED | OUTPATIENT
Start: 2024-11-29 | End: 2024-11-29

## 2024-11-29 RX ORDER — LIDOCAINE HYDROCHLORIDE AND EPINEPHRINE 15; 5 MG/ML; UG/ML
INJECTION, SOLUTION EPIDURAL AS NEEDED
Status: DISCONTINUED | OUTPATIENT
Start: 2024-11-29 | End: 2024-11-29 | Stop reason: SURG

## 2024-11-29 RX ORDER — SODIUM CHLORIDE 0.9 % (FLUSH) 0.9 %
10 SYRINGE (ML) INJECTION AS NEEDED
Status: DISCONTINUED | OUTPATIENT
Start: 2024-11-29 | End: 2024-11-29

## 2024-11-29 RX ORDER — TRANEXAMIC ACID 10 MG/ML
1000 INJECTION, SOLUTION INTRAVENOUS ONCE AS NEEDED
Status: COMPLETED | OUTPATIENT
Start: 2024-11-29 | End: 2024-11-29

## 2024-11-29 RX ORDER — OXYTOCIN/0.9 % SODIUM CHLORIDE 30/500 ML
250 PLASTIC BAG, INJECTION (ML) INTRAVENOUS CONTINUOUS
Status: DISPENSED | OUTPATIENT
Start: 2024-11-29 | End: 2024-11-29

## 2024-11-29 RX ORDER — DIPHENHYDRAMINE HYDROCHLORIDE 50 MG/ML
12.5 INJECTION INTRAMUSCULAR; INTRAVENOUS EVERY 8 HOURS PRN
Status: DISCONTINUED | OUTPATIENT
Start: 2024-11-29 | End: 2024-11-29

## 2024-11-29 RX ORDER — SODIUM CHLORIDE 0.9 % (FLUSH) 0.9 %
1-10 SYRINGE (ML) INJECTION AS NEEDED
Status: DISCONTINUED | OUTPATIENT
Start: 2024-11-29 | End: 2024-12-01 | Stop reason: HOSPADM

## 2024-11-29 RX ORDER — HYDROCORTISONE 25 MG/G
1 CREAM TOPICAL AS NEEDED
Status: DISCONTINUED | OUTPATIENT
Start: 2024-11-29 | End: 2024-12-01 | Stop reason: HOSPADM

## 2024-11-29 RX ORDER — BISACODYL 10 MG
10 SUPPOSITORY, RECTAL RECTAL DAILY PRN
Status: DISCONTINUED | OUTPATIENT
Start: 2024-11-30 | End: 2024-12-01 | Stop reason: HOSPADM

## 2024-11-29 RX ORDER — ONDANSETRON 2 MG/ML
4 INJECTION INTRAMUSCULAR; INTRAVENOUS EVERY 6 HOURS PRN
Status: DISCONTINUED | OUTPATIENT
Start: 2024-11-29 | End: 2024-11-29

## 2024-11-29 RX ORDER — FAMOTIDINE 20 MG/1
20 TABLET, FILM COATED ORAL 2 TIMES DAILY PRN
Status: DISCONTINUED | OUTPATIENT
Start: 2024-11-29 | End: 2024-11-29

## 2024-11-29 RX ORDER — EPHEDRINE SULFATE 50 MG/ML
5 INJECTION, SOLUTION INTRAVENOUS
Status: DISCONTINUED | OUTPATIENT
Start: 2024-11-29 | End: 2024-11-29

## 2024-11-29 RX ORDER — OXYTOCIN/0.9 % SODIUM CHLORIDE 30/500 ML
125 PLASTIC BAG, INJECTION (ML) INTRAVENOUS ONCE AS NEEDED
Status: COMPLETED | OUTPATIENT
Start: 2024-11-29 | End: 2024-11-29

## 2024-11-29 RX ORDER — CARBOPROST TROMETHAMINE 250 UG/ML
250 INJECTION, SOLUTION INTRAMUSCULAR
Status: DISCONTINUED | OUTPATIENT
Start: 2024-11-29 | End: 2024-11-29

## 2024-11-29 RX ORDER — TERBUTALINE SULFATE 1 MG/ML
0.25 INJECTION, SOLUTION SUBCUTANEOUS AS NEEDED
Status: DISCONTINUED | OUTPATIENT
Start: 2024-11-29 | End: 2024-11-29

## 2024-11-29 RX ORDER — ONDANSETRON 2 MG/ML
4 INJECTION INTRAMUSCULAR; INTRAVENOUS ONCE AS NEEDED
Status: DISCONTINUED | OUTPATIENT
Start: 2024-11-29 | End: 2024-11-29

## 2024-11-29 RX ORDER — CALCIUM CARBONATE 500 MG/1
2 TABLET, CHEWABLE ORAL 3 TIMES DAILY PRN
Status: DISCONTINUED | OUTPATIENT
Start: 2024-11-29 | End: 2024-12-01 | Stop reason: HOSPADM

## 2024-11-29 RX ORDER — MAGNESIUM CARB/ALUMINUM HYDROX 105-160MG
30 TABLET,CHEWABLE ORAL ONCE AS NEEDED
Status: DISCONTINUED | OUTPATIENT
Start: 2024-11-29 | End: 2024-11-29

## 2024-11-29 RX ADMIN — METHYLERGONOVINE MALEATE 200 MCG: 0.2 INJECTION, SOLUTION INTRAMUSCULAR; INTRAVENOUS at 14:45

## 2024-11-29 RX ADMIN — OXYTOCIN 2 MILLI-UNITS/MIN: 10 INJECTION, SOLUTION INTRAMUSCULAR; INTRAVENOUS at 06:02

## 2024-11-29 RX ADMIN — SODIUM CHLORIDE, SODIUM LACTATE, POTASSIUM CHLORIDE, CALCIUM CHLORIDE 125 ML/HR: 20; 30; 600; 310 INJECTION, SOLUTION INTRAVENOUS at 04:45

## 2024-11-29 RX ADMIN — Medication 10 ML/HR: at 05:04

## 2024-11-29 RX ADMIN — SODIUM CHLORIDE, SODIUM LACTATE, POTASSIUM CHLORIDE, CALCIUM CHLORIDE 125 ML/HR: 20; 30; 600; 310 INJECTION, SOLUTION INTRAVENOUS at 09:48

## 2024-11-29 RX ADMIN — Medication 8 ML/HR: at 11:55

## 2024-11-29 RX ADMIN — OXYTOCIN 125 ML/HR: 10 INJECTION, SOLUTION INTRAMUSCULAR; INTRAVENOUS at 15:31

## 2024-11-29 RX ADMIN — Medication: at 18:05

## 2024-11-29 RX ADMIN — IBUPROFEN 600 MG: 600 TABLET ORAL at 18:05

## 2024-11-29 RX ADMIN — LIDOCAINE HYDROCHLORIDE 3 ML: 15 INJECTION, SOLUTION EPIDURAL; INFILTRATION; INTRACAUDAL; PERINEURAL at 04:55

## 2024-11-29 RX ADMIN — TRANEXAMIC ACID 1000 MG: 10 INJECTION, SOLUTION INTRAVENOUS at 14:48

## 2024-11-29 RX ADMIN — LIDOCAINE HYDROCHLORIDE 2 ML: 15 INJECTION, SOLUTION EPIDURAL; INFILTRATION; INTRACAUDAL; PERINEURAL at 05:04

## 2024-11-29 RX ADMIN — DOCUSATE SODIUM 100 MG: 100 CAPSULE, LIQUID FILLED ORAL at 20:19

## 2024-11-29 RX ADMIN — LIDOCAINE HYDROCHLORIDE 3 ML: 15 INJECTION, SOLUTION EPIDURAL; INFILTRATION; INTRACAUDAL; PERINEURAL at 05:00

## 2024-11-29 RX ADMIN — SODIUM CHLORIDE, SODIUM LACTATE, POTASSIUM CHLORIDE, CALCIUM CHLORIDE 125 ML/HR: 20; 30; 600; 310 INJECTION, SOLUTION INTRAVENOUS at 02:27

## 2024-11-29 RX ADMIN — LIDOCAINE HYDROCHLORIDE AND EPINEPHRINE 3 ML: 15; 5 INJECTION, SOLUTION EPIDURAL at 04:51

## 2024-11-29 NOTE — ANESTHESIA POSTPROCEDURE EVALUATION
Patient: Azeb Keller    Procedure Summary       Date: 11/29/24 Room / Location:     Anesthesia Start: 0431 Anesthesia Stop: 1435    Procedure: LABOR ANALGESIA Diagnosis:     Scheduled Providers:  Provider: Bartolo Miranda DO    Anesthesia Type: epidural ASA Status: 3            Anesthesia Type: epidural    Vitals  Vitals Value Taken Time   /68 11/29/24 1446   Temp 37.2 °C (98.9 °F) 11/29/24 1030   Pulse 150 11/29/24 1446   Resp 18 11/29/24 1100   SpO2 100 % 11/29/24 1445   Vitals shown include unfiled device data.        Post Anesthesia Care and Evaluation      Comments: I and/or one of my partners was immediately available throughout the patient's labor and the immediate post partum period.

## 2024-11-29 NOTE — H&P
[facility]  Obstetric History and Physical    Chief Complaint   Patient presents with    Contractions     Pt states contractions started around 1600 and have gotten worse since then. Around 2100 she noticed contractions getting worse and more regular. PT is rating pain 6/10. -VB, -LOF, +FM       Subjective     Patient is a 24 y.o. female  currently at 40w1d, who presents with labor. She is comfortable with an epidural    This pregnancy has been complicated by maternal anemia.        Prenatal Information:  Prenatal Results       Initial Prenatal Labs       Test Value Reference Range Date Time    Hemoglobin  10.4 g/dL 12.0 - 15.9 07/15/24 1043       10.4 g/dL 11.1 - 15.9 24 1025    Hematocrit  34.1 % 34.0 - 46.6 07/15/24 1043       34.5 % 34.0 - 46.6 24 1025    Platelets  243 10*3/mm3 140 - 450 07/15/24 1043       303 x10E3/uL 150 - 450 24 1025    Rubella IgG  2.56 index Immune >0.99 24 1025    Hepatitis B SAg  Negative  Negative 24 1025       Negative  Negative 24 1208    Hepatitis C Ab  Non Reactive  Non Reactive 24 1025       Non Reactive  Non Reactive 24 1208    RPR  Non Reactive  Non Reactive 24 1025    T. Pallidum Ab   Non-Reactive  Non-Reactive 24 0212       Non Reactive  Non Reactive 24 1417    ABO  B   24 0212    Rh  Positive   24 0212    Antibody Screen  Negative  Negative 24 1025    HIV  Non Reactive  Non Reactive 24 1025       Non Reactive  Non Reactive 24 1208    Urine Culture  Final report   24 1056    Gonorrhea  Negative  Negative 10/07/24 1205       Negative  Negative 24 1207       Negative  Negative 24 1207    Chlamydia  Negative  Negative 10/07/24 1205       Negative  Negative 24 1207       Negative  Negative 24 1207    TSH        HgB A1c         Varicella IgG        Hemoglobinopathy Fractionation  Comment   24 1025    Hemoglobinopathy (genetic testing)         Cystic fibrosis         Spinal muscular atrophy        Fragile X                  Fetal testing        Test Value Reference Range Date Time    NIPT        MSAFP        AFP-4                  2nd and 3rd Trimester       Test Value Reference Range Date Time    Hemoglobin (repeated)  12.8 g/dL 12.0 - 15.9 11/29/24 0212       11.3 g/dL 11.1 - 15.9 09/13/24 1417    Hematocrit (repeated)  38.5 % 34.0 - 46.6 11/29/24 0212       35.9 % 34.0 - 46.6 09/13/24 1417    Platelets   222 10*3/mm3 140 - 450 11/29/24 0212       219 x10E3/uL 150 - 450 09/13/24 1417       243 10*3/mm3 140 - 450 07/15/24 1043       303 x10E3/uL 150 - 450 04/29/24 1025    1 hour GTT   114 mg/dL 70 - 139 09/13/24 1417    Antibody Screen (repeated)  Negative   11/29/24 0212    3rd TM syphilis scrn (repeated)  RPR         3rd TM syphilis scrn (repeated) TP-Ab  Non-Reactive  Non-Reactive 11/29/24 0212       Non Reactive  Non Reactive 09/13/24 1417    3rd TM syphilis screen TB-Ab (FTA)  Non-Reactive  Non-Reactive 11/29/24 0212       Non Reactive  Non Reactive 09/13/24 1417    Syphilis cascade test TP-Ab (EIA)        Syphilis cascade TPPA        GTT Fasting        GTT 1 Hr        GTT 2 Hr        GTT 3 Hr        Group B Strep  Negative  Negative 11/06/24 1407              Other testing        Test Value Reference Range Date Time    Parvo IgG         CMV IgG                   Drug Screening       Test Value Reference Range Date Time    Amphetamine Screen        Barbiturate Screen        Benzodiazepine Screen        Methadone Screen        Phencyclidine Screen        Opiates Screen        THC Screen        Cocaine Screen        Propoxyphene Screen        Buprenorphine Screen        Methamphetamine Screen        Oxycodone Screen        Tricyclic Antidepressants Screen                  Legend    ^: Historical                          External Prenatal Results       Pregnancy Outside Results - Transcribed From Office Records - See Scanned Records For Details        Test Value Date Time    ABO  B  11/29/24 0212    Rh  Positive  11/29/24 0212    Antibody Screen  Negative  11/29/24 0212       Negative  04/29/24 1025    Varicella IgG       Rubella  2.56 index 04/29/24 1025    Hgb  12.8 g/dL 11/29/24 0212       11.3 g/dL 09/13/24 1417       10.4 g/dL 07/15/24 1043       10.4 g/dL 04/29/24 1025    Hct  38.5 % 11/29/24 0212       35.9 % 09/13/24 1417       34.1 % 07/15/24 1043       34.5 % 04/29/24 1025    HgB A1c        1h GTT  114 mg/dL 09/13/24 1417    3h GTT Fasting       3h GTT 1 hour       3h GTT 2 hour       3h GTT 3 hour        Gonorrhea (discrete)  Negative  10/07/24 1205       Negative  04/01/24 1207       Negative  04/01/24 1207    Chlamydia (discrete)  Negative  10/07/24 1205       Negative  04/01/24 1207       Negative  04/01/24 1207    RPR  Non Reactive  04/29/24 1025    Syphils cascade: TP-Ab (FTA)  Non-Reactive  11/29/24 0212       Non Reactive  09/13/24 1417    TP-Ab  Non-Reactive  11/29/24 0212       Non Reactive  09/13/24 1417    TP-Ab (EIA)       TPPA       HBsAg  Negative  04/29/24 1025       Negative  04/01/24 1208    Herpes Simplex Virus PCR       Herpes Simplex VIrus Culture       HIV  Non Reactive  04/29/24 1025       Non Reactive  04/01/24 1208    Hep C RNA Quant PCR       Hep C Antibody  Non Reactive  04/29/24 1025       Non Reactive  04/01/24 1208    AFP       NIPT       Cystic Fibrosis (Rachel)       Cystic Fibroisis        Spinal Muscular atrophy       Fragile X       Group B Strep  Negative  11/06/24 1407    GBS Susceptibility to Clindamycin       GBS Susceptibility to Erythromycin       Fetal Fibronectin       Genetic Testing, Maternal Blood                 Drug Screening       Test Value Date Time    Urine Drug Screen       Amphetamine Screen       Barbiturate Screen       Benzodiazepine Screen       Methadone Screen       Phencyclidine Screen       Opiates Screen       THC Screen       Cocaine Screen       Propoxyphene Screen       Buprenorphine  Screen       Methamphetamine Screen       Oxycodone Screen       Tricyclic Antidepressants Screen                 Legend    ^: Historical                             Past OB History:     OB History    Para Term  AB Living   1 0 0 0 0 0   SAB IAB Ectopic Molar Multiple Live Births   0 0 0 0 0 0      # Outcome Date GA Lbr Kd/2nd Weight Sex Type Anes PTL Lv   1 Current                Past Medical History: Past Medical History:   Diagnosis Date    Anemia       Past Surgical History Past Surgical History:   Procedure Laterality Date    EYE SURGERY        Family History: History reviewed. No pertinent family history.   Social History:  reports that she has never smoked. She has never used smokeless tobacco.   reports no history of alcohol use.   reports no history of drug use.         Review of Systems - Negative except per HPI for 10 point review of systems including General, Psychological, Ophthalmic, ENT, Endocrine, Respiratory, Cardiovascular, Gastrointestinal, Genito-Urinary, Musculoskeletal, Neurological, Dermatological      Objective       Vital Signs Range for the last 24 hours  Temperature: Temp:  [97.9 °F (36.6 °C)-98.5 °F (36.9 °C)] 98.5 °F (36.9 °C)   Temp Source: Temp src: Oral   BP: BP: ()/(39-91) 129/77   Pulse: Heart Rate:  [] 112   Respirations: Resp:  [16-18] 18   SPO2: SpO2:  [93 %-100 %] 100 %   O2 Amount (l/min):     O2 Devices     Weight: Weight:  [117 kg (257 lb)-117 kg (257 lb 3.2 oz)] 117 kg (257 lb)     Physical Examination: General appearance - alert, well appearing, and in no distress  Mental status - alert, oriented to person, place, and time  Eyes - sclera anicteric, left eye normal, right eye normal  Chest - no tachypnea, retractions or cyanosis  Heart - normal rate and regular rhythm  Abdomen - soft, nontender, gravid  Pelvic - nl ext genitalia, cervix 4/90/-2, AROM with initially clear fluid.  IUPC placed without resistance. Fluid in IUPC with slight yellow  tinge- will continue to monitor  Neurological - alert, oriented, normal speech,   Musculoskeletal - no joint tenderness, deformity or swelling  Extremities - pedal edema trace  Skin - normal coloration and turgor, no rashes, no suspicious skin lesions noted    Presentation: Vertex   Cervix: Exam by: Method: sterile vaginal exam performed (Dr. Gonzalez)   Dilation: Cervical Dilation (cm): 4   Effacement: Cervical Effacement: 90   Station:         Fetal Heart Rate Assessment   Method: Fetal HR Assessment Method: external   Beats/min: Fetal HR (beats/min): 140   Baseline: Fetal HR Baseline: normal range   Varibility: Fetal HR Variability: moderate (amplitude range 6 to 25 bpm)   Accels: Fetal HR Accelerations: greater than/equal to 15 bpm, lasting at least 15 seconds   Decels: Fetal HR Decelerations: variable   Tracing Category:       Uterine Assessment   Method: Method: external tocotransducer, palpation   Frequency (min): Contraction Frequency (Minutes): 2-3   Ctx Count in 10 min:     Duration:     Intensity: Contraction Intensity: moderate by palpation   Intensity by IUPC:     Resting Tone: Uterine Resting Tone: soft by palpation   Resting Tone by IUPC:     [place]ox[place] Units:       Lab Results   Component Value Date    WBC 15.76 (H) 11/29/2024    HGB 12.8 11/29/2024    HCT 38.5 11/29/2024    MCV 83.7 11/29/2024     11/29/2024      US: Intrauterine pregnancy at 39w6d  Type of study:  Interval growth and BPP  Normal Interval growth  EFW:63.8%ile, AC:30.7%ile  Fetal position: Vertex  Biophysical profile: Reassuring  8/8  JAMES: 11.19 cm   [id number] (8lb)    Assessment & Plan   Assessment:  1.  Intrauterine pregnancy at 40w1d weeks gestation with reactive, reassuring fetal status.    2.  Labor  3.  GBS status: Negative    Plan:  1. IOL:   On pitocin.  AROM this check with IUPC placed after patient consent. Mom and baby tolerated well.  Will check fluid as initially appeared clear but had slight yellow tinge in  tubing.  2. Plan of care has been reviewed with patient and .  Risks, benefits of treatment plan have been discussed.  All questions have been answered.      Shannan Gonzalez MD  11/29/2024  10:31 EST

## 2024-11-29 NOTE — ANESTHESIA PROCEDURE NOTES
Labor Epidural    Pre-sedation assessment completed: 11/29/2024 4:31 AM    Patient reassessed immediately prior to procedure    Patient location during procedure: OB  Start Time: 11/29/2024 4:31 AM  Stop Time: 11/29/2024 5:04 AM  Indication:at surgeon's request  Performed By  Anesthesiologist: Mohsen Roberson MD  Preanesthetic Checklist  Completed: patient identified, IV checked, site marked, risks and benefits discussed, surgical consent, monitors and equipment checked, pre-op evaluation and timeout performed  Additional Notes  Needed long needle  Prep:  Pt Position:sitting  Sterile Tech:cap, gloves, gown, mask and sterile barrier  Prep:chlorhexidine gluconate and isopropyl alcohol  Monitoring:blood pressure monitoring and EKG  Epidural Block Procedure:  Approach:midline  Guidance:landmark technique and palpation technique  Location:L4-L5  Needle Type:Tuohy  Needle Gauge:17  Loss of Resistance Medium: saline  Paresthesia: none  Aspiration:negative  Test Dose:negative  Number of Attempts: 2  Post Assessment:  Dressing:occlusive dressing applied and secured with tape  Pt Tolerance:patient tolerated the procedure well with no apparent complications  Complications:no

## 2024-11-29 NOTE — L&D DELIVERY NOTE
[facility]   Vaginal Delivery Note    Patient Name: Azeb Keller  : 2000  MRN: [id number]    Date of Delivery: 2024    Diagnosis     Pre & Post-Delivery:  Intrauterine pregnancy at 40w1d  Labor status: Spontaneous Onset of Labor    * No active hospital problems. *             Problem List    Transfer to Postpartum     Review the Delivery Report for details.     Delivery     Delivery: Vaginal, Spontaneous    YOB: 2026   Time of Birth:  Gestational Age 2:35 PM  40w1d     Anesthesia: Epidural;Local    Delivering clinician: Shannan Gonzalez   Forceps?   No   Vacuum? No    Shoulder dystocia present: No        Delivery narrative:    Preop diagnosis:  24 y.o.  year old  at 40w1d      Spontaneous labor  Postop diagnosis: Same    Indications: Azeb Keller is a 24 y.o.  who presented at 40w1d with labor. She was augmented with pitocin and AROM.  An IUPC was placed. She then progressed along a normal labor curve to complete dilation.  She pushed for a little over an hour with good maternal effort.    Findings: Male infant, Apgar 8/9, Weight pending; second degree perineal, bilateral vaginal sulcal lacerations noted and repaired; Placenta intact, marginal cord insertion noted    QBL:      Procedure:The cervix was noted to be completely dilated, completely effaced, and +3 station. Under continuous electronic fetal heart rate monitoring, the patient was encouraged to push.  Over the course of several contractions the fetal station remained +3 without  and then precipitously with good maternal effort she delivered through one contraction a vigorous, viable male infant. The head presented in the OA presentation and restituted to maternal right. There was a single loose nuchal cord present which was reduced. The right anterior fetal shoulder was then easily delivered with a gentle downward motion followed by the posterior fetal shoulder, followed by the remainder of the infant  "without difficulty. The infant was immediately vigorous. The cord was clamped around 90 seconds following delivery. The cord was cut and the infant was placed skin to skin. Cord blood was then collected. The placenta then delivered spontaneously intact, and a three vessel cord was noted. Uterine massage and IV Pitocin were given; bleeding was noted and IM methergine and TXA were given along with uterine massage until the fundus was firm. The cervix, vagina, perineum, and rectum were carefully inspected for lacerations and bilateral sulcal lacerations, a second degree perineal were noted. The patient could feel pain so 20mL of local anesthetic was given along the lacerations.  These were repaired with 3-0 vicryl in standard fashion. Hemostasis was obtained, but there was still some oozing from areas of abrasions along the sidewalls. With pressure, this improved so a petersen catheter was placed and packing was placed.  This will be removed after recovery.  Patient was counseled on findings. Counts for needles, sponges, laps and instruments were correct times two at the end of the delivery. There were no sponges left in the vagina. There were no major complications. Mother and baby were bonding well at the end of the delivery.            Infant     Findings: male infant     Infant observations: Weight: No birth weight on file.  Length:   in  Observations/Comments:  Angelestram Rm 4     Apgars: 8  @ 1 minute /    9  @ 5 minutes   Infant Name: Kendall     Placenta & Cord         Placenta delivered  Spontaneous at   11/29/2024  2:41 PM    Cord: 3 vessels present.   Nuchal Cord?  yes; Number of nuchal loops present:      Cord blood obtained: Yes   Cord gases obtained:  No   Cord gas results: Venous:  No results found for: \"PHCVEN\", \"BECVEN\"    Arterial:  No results found for: \"PHCART\", \"BECART\"     Repair     Episiotomy: None    No    Lacerations: Yes  Laceration Information  Laceration Repaired?   Perineal: 2nd Yes   Periurethral:    "    Labial:       Sulcus:       Vaginal:       Cervical:         Suture used for repair: 3-0 Vicryl  Laceration Length for 3rd or 4th degree lacerations: N/a   Estimated Blood Loss:       Quantitative Blood Loss:          Complications     Postpartum hemorrhage secondary atony and lacerations    Disposition     Mother to Mother Baby/Postpartum  in stable condition currently.  Baby to remains with mom  in stable condition currently.    Shannan Gonzalez MD  11/29/24  15:20 EST

## 2024-11-29 NOTE — PLAN OF CARE
Problem: Adult Inpatient Plan of Care  Goal: Plan of Care Review  Outcome: Progressing  Flowsheets (Taken 11/29/2024 1621)  Outcome Evaluation: Vaginal delivery of male infant. VSS. vaginal packing in placed that is to be removed at end of recovery. F/C in replaced that is to be removed at the end of her recovery. . Hgb started at 12.8.  Goal: Patient-Specific Goal (Individualized)  Outcome: Progressing  Flowsheets (Taken 11/29/2024 1621)  Patient/Family-Specific Goals (Include Timeframe): Anticipated transfer to postpartum after recovery. Healthy mom and baby  Goal: Absence of Hospital-Acquired Illness or Injury  Outcome: Progressing  Intervention: Identify and Manage Fall Risk  Recent Flowsheet Documentation  Taken 11/29/2024 1530 by Cami Trevino RN  Safety Promotion/Fall Prevention: safety round/check completed  Taken 11/29/2024 0730 by Cami Trevino RN  Safety Promotion/Fall Prevention: safety round/check completed  Intervention: Prevent and Manage VTE (Venous Thromboembolism) Risk  Recent Flowsheet Documentation  Taken 11/29/2024 0730 by Cami Trevino RN  VTE Prevention/Management:   bilateral   SCDs (sequential compression devices) on  Intervention: Prevent Infection  Recent Flowsheet Documentation  Taken 11/29/2024 0730 by Cami Trevino RN  Infection Prevention:   single patient room provided   rest/sleep promoted   hand hygiene promoted  Goal: Optimal Comfort and Wellbeing  Outcome: Progressing  Intervention: Monitor Pain and Promote Comfort  Recent Flowsheet Documentation  Taken 11/29/2024 1600 by Cami Trevino RN  Pain Management Interventions: medication offered but refused  Intervention: Provide Person-Centered Care  Recent Flowsheet Documentation  Taken 11/29/2024 1530 by Cami Trevino RN  Trust Relationship/Rapport:   care explained   choices provided   questions answered   questions encouraged   reassurance provided  Taken 11/29/2024 0730 by Cami Trevino  RN  Trust Relationship/Rapport:   care explained   choices provided   questions answered   questions encouraged  Goal: Readiness for Transition of Care  Outcome: Progressing     Problem: Pain Acute  Goal: Optimal Pain Control and Function  Outcome: Progressing  Intervention: Develop Pain Management Plan  Recent Flowsheet Documentation  Taken 11/29/2024 1600 by Cami Trevino RN  Pain Management Interventions: medication offered but refused     Problem: Fall Injury Risk  Goal: Absence of Fall and Fall-Related Injury  Outcome: Progressing  Intervention: Promote Injury-Free Environment  Recent Flowsheet Documentation  Taken 11/29/2024 1530 by Cami Trevino RN  Safety Promotion/Fall Prevention: safety round/check completed  Taken 11/29/2024 0730 by Cami Trevino RN  Safety Promotion/Fall Prevention: safety round/check completed     Problem: Skin Injury Risk Increased  Goal: Skin Health and Integrity  Outcome: Progressing  Intervention: Optimize Skin Protection  Recent Flowsheet Documentation  Taken 11/29/2024 1615 by Cami Trevino RN  Activity Management: bedrest  Taken 11/29/2024 1600 by Cami Trevino RN  Activity Management: bedrest  Taken 11/29/2024 1545 by Cami Trevino RN  Activity Management: bedrest  Taken 11/29/2024 1530 by Cami Trevino RN  Activity Management: bedrest  Taken 11/29/2024 0730 by Cami Trevino RN  Activity Management: bedrest     Problem: Anesthesia/Analgesia, Neuraxial  Goal: Baseline Motor Function Return  Outcome: Progressing  Intervention: Optimize Sensorimotor Function and Safety  Recent Flowsheet Documentation  Taken 11/29/2024 1530 by Cami Trevino RN  Safety Promotion/Fall Prevention: safety round/check completed  Taken 11/29/2024 0730 by Cami Trevino RN  Safety Promotion/Fall Prevention: safety round/check completed  Goal: Effective Urinary Elimination  Outcome: Progressing   Goal Outcome Evaluation:              Outcome Evaluation:  Vaginal delivery of male infant. VSS. vaginal packing in placed that is to be removed at end of recovery. F/C in replaced that is to be removed at the end of her recovery. . Hgb started at 12.8.

## 2024-11-29 NOTE — OBED NOTES
"[facility]  Azeb Keller  : 2000  MRN: [id number]  CSN: [id number]    OB ED Provider Note    Subjective   Chief Complaint   Patient presents with    Contractions     Pt states contractions started around 1600 and have gotten worse since then. Around 2100 she noticed contractions getting worse and more regular. PT is rating pain /10. -VB, -LOF, +FM     Azeb Keller is a 24 y.o. year old  with an Estimated Date of Delivery: 24 currently at 40w1d presenting with CTX every several minutes since . She denies VB or ROM. FM is present..    Prenatal care has been with Dr. Verma.  It has been complicated by anemia.    OB History    Para Term  AB Living   1 0 0 0 0 0   SAB IAB Ectopic Molar Multiple Live Births   0 0 0 0 0 0      # Outcome Date GA Lbr Kd/2nd Weight Sex Type Anes PTL Lv   1 Current              No past medical history on file.  Past Surgical History:   Procedure Laterality Date    EYE SURGERY       No current facility-administered medications for this encounter.    No Known Allergies  Social History    Tobacco Use      Smoking status: Never      Smokeless tobacco: Never    Review of Systems   Gastrointestinal:  Positive for abdominal pain.   All other systems reviewed and are negative.        Objective   /84 (BP Location: Right arm, Patient Position: Lying)   Pulse 97   Temp 98.2 °F (36.8 °C) (Oral)   Resp 18   Ht 170.2 cm (67\")   Wt 117 kg (257 lb 3.2 oz)   LMP 2024   SpO2 100%   BMI 40.28 kg/m²   General: WD/WN. A&O x 3, moderately distressed   Abdomen: soft, non-tender; no masses  gravid    FHT's: nonreactive and category 1      Cervix: was checked (by RN): 2 cm / 80 % / -1   Presentation: cephalic   Contractions: every 3 minutes   Chest: Unlabored respirations    CV:  RRR   Ext:   No C/C/E   Back: CVA tenderness is deferred bilateral        Prenatal Labs  Lab Results   Component Value Date    HGB 11.3 2024    RUBELLAABIGG 2.56 2024    " "HEPBSAG Negative 04/29/2024    ABSCRN Negative 04/29/2024    [id number] Non Reactive 04/29/2024    HEPCVIRUSABY Non Reactive 04/29/2024     09/13/2024    STREPGPB Negative 11/06/2024    URINECX Final report 04/29/2024    CHLAMNAA Negative 10/07/2024    NGONORRHON Negative 10/07/2024       Current Labs Reviewed   UA:  No results found for: \"SQUAMEPIUA\", \"SPECGRAVUR\", \"KETONESU\", \"BLOODU\", \"LEUKOCYTESUR\", \"NITRITEU\", \"RBCUA\", \"WBCUA\", \"BACTERIA\"       Assessment   IUP at 40w1d  Early active labor- GBS negative. She desires an epidural     Plan   Admit to L&D for anticipated delivery. Ok for epidural if platelet count normal. Dr. Qiu notified and is assuming management.    Toy Holcomb MD  11/29/2024  01:20 EST     "

## 2024-11-29 NOTE — PLAN OF CARE
Goal Outcome Evaluation:      , 40.1 weeks, will continue to monitor, plan to recheck cervix to decide plan of care, questions/concerns encouraged, pt. verbalizes understanding.

## 2024-11-29 NOTE — ANESTHESIA PREPROCEDURE EVALUATION
Anesthesia Evaluation                  Airway   Mallampati: II  TM distance: >3 FB  Neck ROM: full  no difficulty expected  Dental - normal exam     Pulmonary - normal exam   Cardiovascular - normal exam        Neuro/Psych  GI/Hepatic/Renal/Endo    (+) morbid obesity    Musculoskeletal     Abdominal    Substance History      OB/GYN    (+) Pregnant        Other                    Anesthesia Plan    ASA 3     epidural     (40w1d    )    Anesthetic plan, risks, benefits, and alternatives have been provided, discussed and informed consent has been obtained with: patient.    CODE STATUS:    Level Of Support Discussed With: Patient  Code Status (Patient has no pulse and is not breathing): CPR (Attempt to Resuscitate)  Medical Interventions (Patient has pulse or is breathing): Full Support

## 2024-11-30 LAB
BASOPHILS # BLD AUTO: 0.04 10*3/MM3 (ref 0–0.2)
BASOPHILS NFR BLD AUTO: 0.2 % (ref 0–1.5)
DEPRECATED RDW RBC AUTO: 43.7 FL (ref 37–54)
EOSINOPHIL # BLD AUTO: 0.01 10*3/MM3 (ref 0–0.4)
EOSINOPHIL NFR BLD AUTO: 0.1 % (ref 0.3–6.2)
ERYTHROCYTE [DISTWIDTH] IN BLOOD BY AUTOMATED COUNT: 14.5 % (ref 12.3–15.4)
HCT VFR BLD AUTO: 23.6 % (ref 34–46.6)
HGB BLD-MCNC: 7.9 G/DL (ref 12–15.9)
IMM GRANULOCYTES # BLD AUTO: 0.13 10*3/MM3 (ref 0–0.05)
IMM GRANULOCYTES NFR BLD AUTO: 0.8 % (ref 0–0.5)
LYMPHOCYTES # BLD AUTO: 1.74 10*3/MM3 (ref 0.7–3.1)
LYMPHOCYTES NFR BLD AUTO: 10.7 % (ref 19.6–45.3)
MCH RBC QN AUTO: 27.7 PG (ref 26.6–33)
MCHC RBC AUTO-ENTMCNC: 33.5 G/DL (ref 31.5–35.7)
MCV RBC AUTO: 82.8 FL (ref 79–97)
MONOCYTES # BLD AUTO: 0.86 10*3/MM3 (ref 0.1–0.9)
MONOCYTES NFR BLD AUTO: 5.3 % (ref 5–12)
NEUTROPHILS NFR BLD AUTO: 13.42 10*3/MM3 (ref 1.7–7)
NEUTROPHILS NFR BLD AUTO: 82.9 % (ref 42.7–76)
NRBC BLD AUTO-RTO: 0 /100 WBC (ref 0–0.2)
PLATELET # BLD AUTO: 173 10*3/MM3 (ref 140–450)
PMV BLD AUTO: 11 FL (ref 6–12)
RBC # BLD AUTO: 2.85 10*6/MM3 (ref 3.77–5.28)
WBC NRBC COR # BLD AUTO: 16.2 10*3/MM3 (ref 3.4–10.8)

## 2024-11-30 PROCEDURE — 85025 COMPLETE CBC W/AUTO DIFF WBC: CPT | Performed by: OBSTETRICS & GYNECOLOGY

## 2024-11-30 RX ORDER — FERROUS SULFATE 325(65) MG
325 TABLET ORAL
Status: DISCONTINUED | OUTPATIENT
Start: 2024-11-30 | End: 2024-12-01 | Stop reason: HOSPADM

## 2024-11-30 RX ADMIN — IBUPROFEN 600 MG: 600 TABLET ORAL at 17:08

## 2024-11-30 RX ADMIN — FERROUS SULFATE TAB 325 MG (65 MG ELEMENTAL FE) 325 MG: 325 (65 FE) TAB at 17:08

## 2024-11-30 RX ADMIN — DOCUSATE SODIUM 100 MG: 100 CAPSULE, LIQUID FILLED ORAL at 20:44

## 2024-11-30 RX ADMIN — DOCUSATE SODIUM 100 MG: 100 CAPSULE, LIQUID FILLED ORAL at 09:51

## 2024-11-30 RX ADMIN — Medication 1 TABLET: at 09:51

## 2024-11-30 RX ADMIN — IBUPROFEN 600 MG: 600 TABLET ORAL at 09:51

## 2024-11-30 RX ADMIN — ACETAMINOPHEN 650 MG: 325 TABLET ORAL at 18:56

## 2024-11-30 RX ADMIN — ACETAMINOPHEN 650 MG: 325 TABLET ORAL at 07:01

## 2024-11-30 RX ADMIN — IBUPROFEN 600 MG: 600 TABLET ORAL at 02:25

## 2024-11-30 NOTE — PROGRESS NOTES
Postpartum Progress Note      Status post Vaginal Delivery: Doing well postoperatively.     1) postpartum care immediately following delivery :   - Continue routine postpartum care.  - Anticipate discharge home tomorrow on PPD#2.     2) Postpartum anemia  - Hgb 12.8 on admission--> Hgb 7.9 on PPD#1  - Patient had postpartum hemorrhage secondary to atony and lacerations- required IV pitocin, IM methergine, TXA  -  cc  - We discussed option of IV iron versus oral iron supplementation and risks and benefits of each option. Patient would like to continue oral iron supplementation.     Rh status: B positive   Rubella: Immune  Gender: Male infant - desires male circumcision. Reviewed the elective procedure and risks including but not limited to bleeding, infection, damage to surrounding structures that could require additional surgery or revision, and cosmesis. Consents signed. Pediatrics has since evaluated the baby and recommended deferral until tomorrow at least as they are concern with deviation of the raphe. Possible urology referral needed.   T. Pallidum Antibody: NR on admission     Subjective     Postpartum Day 1: Vaginal delivery    The patient feels well. The patient denies emotional concerns. Pain is well controlled with current medications. The baby is well. The patient is ambulating well. The patient is tolerating a normal diet. She reports vaginal bleeding is lessening. She is voiding without difficulty. She has passed flatus.     Objective     Vital signs in last 24 hours:  Temp:  [97.5 °F (36.4 °C)-100 °F (37.8 °C)] 98.7 °F (37.1 °C)  Heart Rate:  [108-152] 108  Resp:  [16-18] 16  BP: ()/() 109/71      General:    alert, appears stated age, and cooperative   Lungs:  No increased work of breathing, regular respirations    Abdomen:  Soft, Non-tender    Lochia:  appropriate   Uterine Fundus:   firm   Ext    Trace lower extremity bilaterally    DVT Evaluation:  No evidence of DVT seen on  physical exam.     Lab Results   Component Value Date    WBC 16.20 (H) 11/30/2024    HGB 7.9 (L) 11/30/2024    HCT 23.6 (L) 11/30/2024    MCV 82.8 11/30/2024     11/30/2024       Elza Qiu MD  11/30/2024  10:27 EST

## 2024-11-30 NOTE — LACTATION NOTE
Lactation Consult Note  P1, T ,LGA baby- new admission. Rounded on mom at this time.Report she doesn't have any colostrum. Hand expression demonstrated and multiple drops noticed. PT asked LC for help with BF.  Assisted mom with waking up baby and latching him on the left breast in a cross cradle/laid back position. Educated PT on starting nipple to nose to achieve deep latch and baby was able to achieve it after few attempts and some suck training. Infant is latching well and has a good jaw rotation, but is falling asleep. Educated mom on different ways to keep baby awake during BF.  Encouraged her to BF baby every 2-3 hours for 15 min on each breast. Educated on the importance of deep latch, hand expression, how to know if infant is getting enough and burping baby between breasts. Mother is able to express colostrum easily. She is planing on breast and formula feeding. Encouraged her always to offer breast first and then bottle. PT denies any questions. Mom needs PBP, so order faxed. Encouraged to call if needing further help.       Evaluation Completed: 2024 20:26 EST  Patient Name: Azeb Keller  :  2000  MRN:  [id number]     REFERRAL  INFORMATION:                          Date of Referral: 24   Person Making Referral: lactation consultant  Maternal Reason for Referral: breastfeeding currently, latch difficulty  Infant Reason for Referral: sleepy    DELIVERY HISTORY:        Skin to skin initiation date/time: 2024 2:39 PM  Skin to skin end date/time:           MATERNAL ASSESSMENT:  Breast Size Issue: none (24)  Breast Shape: Bilateral:, pendulous (24)  Breast Density: Bilateral:, soft (24)  Areola: Bilateral:, dense (24)  Nipples: Bilateral:, graspable (24)                INFANT ASSESSMENT:  Information for the patient's :  Lyndsay Keller [[id number]]   No past medical history on file.  Feeding Readiness Cues: sleeping  (24)     Feeding Tolerance/Success: arousal required, strong suck, suck inconsistent (24)              Feeding Interventions: arousal required, jaw supported, latch assistance provided, lips stroked, sucking promoted (24)              Breastfeeding: breastfeeding, left side only (24)  Infant Positioning: cross-cradle (24)        Effective Latch During Feeding: yes (24)  Suck/Swallow/Breathing Coordination: present (24)  Signs of Milk Transfer: deep jaw excursions noted (24)      Latch: 1-->repeated attempts, holds nipple in mouth, stimulate to suck (24)  Audible Swallowin-->a few with stimulation (24)  Type of Nipple: 2-->everted (after stimulation) (24)  Comfort (Breast/Nipple): 2-->soft/nontender (24)  Hold (Positioning): 1-->minimal assist, teach one side, mother does other, staff holds (24)  Latch Score: 7 (24)                   MATERNAL INFANT FEEDING:     Maternal Emotional State: receptive, relaxed (24)  Infant Positioning: cross-cradle, clutch/football (24)   Signs of Milk Transfer: deep jaw excursions noted (24)  Pain with Feeding: no (24)           Milk Ejection Reflex: present (24)           Latch Assistance: minimal assistance (24)                               EQUIPMENT TYPE:                                 BREAST PUMPING:          LACTATION REFERRALS:

## 2024-12-01 VITALS
WEIGHT: 257 LBS | TEMPERATURE: 97.6 F | RESPIRATION RATE: 16 BRPM | DIASTOLIC BLOOD PRESSURE: 66 MMHG | OXYGEN SATURATION: 100 % | HEIGHT: 67 IN | SYSTOLIC BLOOD PRESSURE: 114 MMHG | HEART RATE: 98 BPM | BODY MASS INDEX: 40.34 KG/M2

## 2024-12-01 RX ORDER — IBUPROFEN 600 MG/1
600 TABLET, FILM COATED ORAL EVERY 6 HOURS PRN
Qty: 30 TABLET | Refills: 0 | Status: SHIPPED | OUTPATIENT
Start: 2024-12-01 | End: 2024-12-09 | Stop reason: SDUPTHER

## 2024-12-01 RX ORDER — FERROUS SULFATE 325(65) MG
1 TABLET ORAL
Qty: 90 TABLET | Refills: 1 | Status: SHIPPED | OUTPATIENT
Start: 2024-12-01

## 2024-12-01 RX ORDER — PSEUDOEPHEDRINE HCL 30 MG
100 TABLET ORAL 2 TIMES DAILY
Qty: 60 CAPSULE | Refills: 0 | Status: SHIPPED | OUTPATIENT
Start: 2024-12-01

## 2024-12-01 RX ADMIN — IBUPROFEN 600 MG: 600 TABLET ORAL at 02:53

## 2024-12-01 RX ADMIN — Medication 1 TABLET: at 09:04

## 2024-12-01 RX ADMIN — DOCUSATE SODIUM 100 MG: 100 CAPSULE, LIQUID FILLED ORAL at 09:04

## 2024-12-01 RX ADMIN — FERROUS SULFATE TAB 325 MG (65 MG ELEMENTAL FE) 325 MG: 325 (65 FE) TAB at 09:03

## 2024-12-01 RX ADMIN — TRAMADOL HYDROCHLORIDE 50 MG: 50 TABLET, COATED ORAL at 09:03

## 2024-12-01 RX ADMIN — IBUPROFEN 600 MG: 600 TABLET ORAL at 09:03

## 2024-12-01 NOTE — PROGRESS NOTES
Postpartum Progress Note      Status post Vaginal Delivery: Doing well postoperatively.     1) postpartum care immediately following delivery :    Routine care, plan for discharge today  2) Postpartum anemia:   HgB 7.9 from 12.8, PP hemorrhage secondary to atony and lacerations.  Treated with repair and IV pitocin, IM methergine, TXA   - pt counseled on PPD1 and desired PO Iron. To continue at discharge    Rh status: Positive  Rubella: Immune   Admission syphilis testing: Non-reactive  Gender: male, desires circumcision. Called by RN that nursery team spoke to Urology and baby is cleared for circumcision.  Mom aware of risks/benefits, elective nature of procedure.    Discharge instructions reviewed including but not limited to: Preeclampsia precautions (return with headache, visual changes, right upper quadrant pain, concern for elevated blood pressures, other concerning symptoms), infection precautions (redness around incision, purulent drainage, temp of 100.4 or greater), return with any chest pain, shortness of breath at rest, unilateral leg swelling or calf pain, or other concerning signs or symptoms.  Patient was instructed to monitor mood and that if she feels symptoms of depression she should seek medical attention.  If thoughts of harming self or others arise, she should be seen immediately by a medical professional.  She will call if she needs more than Ibuprofen or tylenol for pain control      Subjective     Postpartum Day 2: Vaginal delivery    The patient feels well. The patient denies emotional concerns. Pain is well controlled with current medications; she took one dose of tramadol this morning for back pain but is unsure if it helped and would like to go home with just ibuprofen. The baby is well. The patient is ambulating well. The patient is tolerating a normal diet.     Objective     Vital signs in last 24 hours:  Temp:  [97.6 °F (36.4 °C)-98.1 °F (36.7 °C)] 97.6 °F (36.4 °C)  Heart Rate:   [] 98  Resp:  [16-18] 16  BP: (104-122)/(66-77) 114/66      General:    alert, appears stated age, and cooperative   CV: Well perfused   Lungs: No respiratory distress   Abdomen:  Soft, Non-tender    Lochia:  appropriate   Uterine Fundus:   firm   Ext    Edema trace   DVT Evaluation:  No evidence of DVT seen on physical exam.     Lab Results   Component Value Date    WBC 16.20 (H) 11/30/2024    HGB 7.9 (L) 11/30/2024    HCT 23.6 (L) 11/30/2024    MCV 82.8 11/30/2024     11/30/2024       Shannan Gonzalez MD  12/1/2024  10:22 EST

## 2024-12-01 NOTE — DISCHARGE SUMMARY
[facility]         DISCHARGE SUMMARY- VAGINAL DELIVERY    Patient Name: Azeb Keller  : 2000  MRN: [id number]    Date of Admission: 2024  Date of Discharge:  24   Primary Care Physician: Provider, No Known    Consults       No orders found from 10/31/2024 to 2024.            Presenting Problem:   Pregnancy [Z34.90]    Active and Resolved Hospital Problems:  Active Hospital Problems   No active problems to display.      Resolved Hospital Problems    Diagnosis POA    **Pregnancy [Z34.90] Not Applicable         Hospital Course     Hospital Course:  Azeb Keller is a 24 y.o. female  who presented at 40w with labor. She progressed with pitocin and AROM to complete dilation. She had a spontaneous vaginal delivery, complicated by hemorrhage secondary to lacerations and atony.  She received IV Pitocin, tranexamic acid, IM Methergine and bimanual massage.  She had a second-degree, bilateral sulcal lacerations.  Packing was placed as there was persistent oozing but this was able to be removed and she remained hemodynamically stable with appropriate bleeding postpartum.  Her postpartum course was complicated by anemia with a hemoglobin of 7.9.  She was recommended iron supplementation at the time of discharge.  Patient was discharged on postpartum day 2 meeting all milestones.              Day of Discharge     Vital Signs:  Temp:  [97.6 °F (36.4 °C)-98.1 °F (36.7 °C)] 97.6 °F (36.4 °C)  Heart Rate:  [] 98  Resp:  [16-18] 16  BP: (114-122)/(66-77) 114/66  Physical Exam:  See note from date of discharge    Pertinent  and/or Most Recent Results     LAB RESULTS:      Lab 2422 24   WBC 16.20* 15.76*   HEMOGLOBIN 7.9* 12.8   HEMATOCRIT 23.6* 38.5   PLATELETS 173 222   NEUTROS ABS 13.42* 13.46*   IMMATURE GRANS (ABS) 0.13* 0.11*   LYMPHS ABS 1.74 1.58   MONOS ABS 0.86 0.55   EOS ABS 0.01 0.02   MCV 82.8 83.7         Lab 24   SODIUM 135*    POTASSIUM 4.0   CHLORIDE 104   CO2 18.0*   ANION GAP 13.0   BUN 10   CREATININE 0.64   EGFR 125.2   GLUCOSE 88   CALCIUM 9.6         Lab 24  0212   TOTAL PROTEIN 7.4   ALBUMIN 3.4*   GLOBULIN 4.0   ALT (SGPT) 15   AST (SGOT) 20   BILIRUBIN <0.2   ALK PHOS 165*                 Lab 24  0212   ABO TYPING B   RH TYPING Positive   ANTIBODY SCREEN Negative         Brief Urine Lab Results  (Last result in the past 365 days)        Color   Clarity   Blood   Leuk Est   Nitrite   Protein   CREAT   Urine HCG        24 1406           Negative                 Microbiology Results (last 10 days)       ** No results found for the last 240 hours. **                             Labs Pending at Discharge:        Discharge Details        Discharge Medications        New Medications        Instructions Start Date   docusate sodium 100 MG capsule   100 mg, Oral, 2 Times Daily      ibuprofen 600 MG tablet  Commonly known as: ADVIL,MOTRIN   600 mg, Oral, Every 6 Hours PRN             Changes to Medications        Instructions Start Date   ferrous sulfate 325 (65 FE) MG tablet  Commonly known as: FeroSul  What changed: medication strength   325 mg, Oral, Daily With Breakfast             Continue These Medications        Instructions Start Date   Prenatal/Folic Acid tablet   1 tablet, Oral, Daily               No Known Allergies      Discharge Disposition:  Home or Self Care    Diet:  Hospital:  Diet Order   Procedures    Diet: Regular/House; Fluid Consistency: Thin (IDDSI 0)         Discharge Activity:   Activity Instructions       Driving Restrictions      No driving while too sore to twist or push break.  Encourage to not drive for at least 4 weeks following  section    Type of Restriction: Driving    Driving Restrictions: No Driving While Taking Narcotics    Pelvic Rest      6 weeks              CODE STATUS:  Code Status and Medical Interventions: CPR (Attempt to Resuscitate); Full   Ordered at: 24 3397      Code Status (Patient has no pulse and is not breathing):    CPR (Attempt to Resuscitate)     Medical Interventions (Patient has pulse or is breathing):    Full         No future appointments.    Additional Instructions for the Follow-ups that You Need to Schedule       Call MD With Problems / Concerns   As directed      Instructions: Discharge instructions reviewed including but not limited to: Preeclampsia precautions (return with headache, visual changes, right upper quadrant pain, concern for elevated blood pressures, other concerning symptoms), infection precautions (redness around incision, purulent drainage, temp of 100.4 or greater), return with any chest pain, shortness of breath at rest, unilateral leg swelling or calf pain, or other concerning signs or symptoms.  Patient was instructed to monitor mood and that if she feels symptoms of depression she should seek medical attention.  If thoughts of harming self or others arise, she should be seen immediately by a medical professional.    Order Comments: Instructions: Discharge instructions reviewed including but not limited to: Preeclampsia precautions (return with headache, visual changes, right upper quadrant pain, concern for elevated blood pressures, other concerning symptoms), infection precautions (redness around incision, purulent drainage, temp of 100.4 or greater), return with any chest pain, shortness of breath at rest, unilateral leg swelling or calf pain, or other concerning signs or symptoms.  Patient was instructed to monitor mood and that if she feels symptoms of depression she should seek medical attention.  If thoughts of harming self or others arise, she should be seen immediately by a medical professional.         Discharge Follow-up with Specified Provider: OBGYN; 6 Weeks   As directed      To: OBGYN   Follow Up: 6 Weeks                Shannan Gonzalez MD

## 2024-12-01 NOTE — PLAN OF CARE
Goal Outcome Evaluation:  Plan of Care Reviewed With: patient        Progress: improving  Outcome Evaluation: vss. pain controlled with prn meds. fundus and lochia wnl. bonding well with infant.

## 2024-12-01 NOTE — LACTATION NOTE
This note was copied from a baby's chart.  PT is going home today. Reports baby is BF some, but she is also supplementing with formula. . Educated again on the importance of stimulation for adequate milk  supply. Informed mom about the [facility] info on the back of the edicational booklet. Discussed engourgement, pumping, milk storage and when to expect mature milk. Father will come to  her PBP tomorrow or Tuesday is she gets approved.  PT denieis any questions.

## 2024-12-01 NOTE — PLAN OF CARE
Goal Outcome Evaluation:              Outcome Evaluation: vss, pain meds used appropriately, dc to home

## 2024-12-02 NOTE — PAYOR COMM NOTE
"Escobar Ruano (24 y.o. Female)       Date of Birth   2000    Social Security Number       Address   [address] [address]    Home Phone   [phone]    MRN   [id number]       Sikh   Presybeterian    Marital Status                               Admission Date   11/29/24    Admission Type   Emergency    Admitting Provider   Toy Holcomb MD    Attending Provider       Department, Room/Bed   [address], E358/1       Discharge Date   12/1/2024    Discharge Disposition   Home or Self Care    Discharge Destination                                 Attending Provider: (none)   Allergies: No Known Allergies    Isolation: None   Infection: None   Code Status: Prior    Ht: 170.2 cm (67.01\")   Wt: 117 kg (257 lb)    Admission Cmt: None   Principal Problem: Pregnancy [Z34.90]                   Active Insurance as of 11/29/2024       Primary Coverage       Payor Plan Insurance Group Employer/Plan Group    ANTHEM BLUE CROSS ANTHEM BLUE CROSS BLUE SHIELD PPO [id number]       Payor Plan Address Payor Plan Phone Number Payor Plan Fax Number Effective Dates    [facility] [id number] [phone]  4/1/2024 - None Entered    [facility] [id number]         Subscriber Name Subscriber Birth Date Member ID       ESCOBAR RUANO 2000 [id number]               Secondary Coverage       Payor Plan Insurance Group Employer/Plan Group    MEDICAID PENDING MEDICAID PENDING        Payor Plan Address Payor Plan Phone Number Payor Plan Fax Number Effective Dates       11/29/2024 - 1/31/2025      Subscriber Name Subscriber Birth Date Member ID       ESCOBAR RUANO 2000                      Emergency Contacts        (Rel.) Home Phone Work Phone Mobile Phone    abdi jarvis (Spouse) -- -- [phone]              Insurance Information                  ANTHEM BLUE CROSS/ANTHEM BLUE CROSS BLUE SHIELD PPO Phone: [phone]    Subscriber: Escobar Ruano Subscriber#: [id number]    Group#: " [id number] Precert#: --    Authorization#: OBTAIN AFTER DELIVERY Effective Date: --        MEDICAID PENDING/MEDICAID PENDING Phone: --    Subscriber: Azeb Keller Subscriber#: --    Group#: -- Precert#: --    Authorization#: -- Effective Date: 24          Problem List             Codes Noted - Resolved       Non-Hospital    Maternal anemia in pregnancy, antepartum ICD-10-CM: O99.019  ICD-9-CM: 648.23, 285.9 2024 - Present        History & Physical        Shannan Gonzalez MD at 24 1031          [facility]  Obstetric History and Physical    Chief Complaint   Patient presents with    Contractions     Pt states contractions started around 1600 and have gotten worse since then. Around 2100 she noticed contractions getting worse and more regular. PT is rating pain 6/10. -VB, -LOF, +FM       Subjective    Patient is a 24 y.o. female  currently at 40w1d, who presents with labor. She is comfortable with an epidural    This pregnancy has been complicated by maternal anemia.        Prenatal Information:  Prenatal Results       Initial Prenatal Labs       Test Value Reference Range Date Time    Hemoglobin  10.4 g/dL 12.0 - 15.9 07/15/24 1043       10.4 g/dL 11.1 - 15.9 24 1025    Hematocrit  34.1 % 34.0 - 46.6 07/15/24 1043       34.5 % 34.0 - 46.6 24 1025    Platelets  243 10*3/mm3 140 - 450 07/15/24 1043       303 x10E3/uL 150 - 450 24 1025    Rubella IgG  2.56 index Immune >0.99 24 1025    Hepatitis B SAg  Negative  Negative 24 1025       Negative  Negative 24 1208    Hepatitis C Ab  Non Reactive  Non Reactive 24 1025       Non Reactive  Non Reactive 24 1208    RPR  Non Reactive  Non Reactive 24 1025    T. Pallidum Ab   Non-Reactive  Non-Reactive 24 0212       Non Reactive  Non Reactive 24 1417    ABO  B   24 0212    Rh  Positive   24 0212    Antibody Screen  Negative  Negative 24 1025    HIV  Non Reactive  Non Reactive  04/29/24 1025       Non Reactive  Non Reactive 04/01/24 1208    Urine Culture  Final report   04/29/24 1056    Gonorrhea  Negative  Negative 10/07/24 1205       Negative  Negative 04/01/24 1207       Negative  Negative 04/01/24 1207    Chlamydia  Negative  Negative 10/07/24 1205       Negative  Negative 04/01/24 1207       Negative  Negative 04/01/24 1207    TSH        HgB A1c         Varicella IgG        Hemoglobinopathy Fractionation  Comment   04/29/24 1025    Hemoglobinopathy (genetic testing)        Cystic fibrosis         Spinal muscular atrophy        Fragile X                  Fetal testing        Test Value Reference Range Date Time    NIPT        MSAFP        AFP-4                  2nd and 3rd Trimester       Test Value Reference Range Date Time    Hemoglobin (repeated)  12.8 g/dL 12.0 - 15.9 11/29/24 0212       11.3 g/dL 11.1 - 15.9 09/13/24 1417    Hematocrit (repeated)  38.5 % 34.0 - 46.6 11/29/24 0212       35.9 % 34.0 - 46.6 09/13/24 1417    Platelets   222 10*3/mm3 140 - 450 11/29/24 0212       219 x10E3/uL 150 - 450 09/13/24 1417       243 10*3/mm3 140 - 450 07/15/24 1043       303 x10E3/uL 150 - 450 04/29/24 1025    1 hour GTT   114 mg/dL 70 - 139 09/13/24 1417    Antibody Screen (repeated)  Negative   11/29/24 0212    3rd TM syphilis scrn (repeated)  RPR         3rd TM syphilis scrn (repeated) TP-Ab  Non-Reactive  Non-Reactive 11/29/24 0212       Non Reactive  Non Reactive 09/13/24 1417    3rd TM syphilis screen TB-Ab (FTA)  Non-Reactive  Non-Reactive 11/29/24 0212       Non Reactive  Non Reactive 09/13/24 1417    Syphilis cascade test TP-Ab (EIA)        Syphilis cascade TPPA        GTT Fasting        GTT 1 Hr        GTT 2 Hr        GTT 3 Hr        Group B Strep  Negative  Negative 11/06/24 1407              Other testing        Test Value Reference Range Date Time    Parvo IgG         CMV IgG                   Drug Screening       Test Value Reference Range Date Time    Amphetamine Screen         Barbiturate Screen        Benzodiazepine Screen        Methadone Screen        Phencyclidine Screen        Opiates Screen        THC Screen        Cocaine Screen        Propoxyphene Screen        Buprenorphine Screen        Methamphetamine Screen        Oxycodone Screen        Tricyclic Antidepressants Screen                  Legend    ^: Historical                          External Prenatal Results       Pregnancy Outside Results - Transcribed From Office Records - See Scanned Records For Details       Test Value Date Time    ABO  B  11/29/24 0212    Rh  Positive  11/29/24 0212    Antibody Screen  Negative  11/29/24 0212       Negative  04/29/24 1025    Varicella IgG       Rubella  2.56 index 04/29/24 1025    Hgb  12.8 g/dL 11/29/24 0212       11.3 g/dL 09/13/24 1417       10.4 g/dL 07/15/24 1043       10.4 g/dL 04/29/24 1025    Hct  38.5 % 11/29/24 0212       35.9 % 09/13/24 1417       34.1 % 07/15/24 1043       34.5 % 04/29/24 1025    HgB A1c        1h GTT  114 mg/dL 09/13/24 1417    3h GTT Fasting       3h GTT 1 hour       3h GTT 2 hour       3h GTT 3 hour        Gonorrhea (discrete)  Negative  10/07/24 1205       Negative  04/01/24 1207       Negative  04/01/24 1207    Chlamydia (discrete)  Negative  10/07/24 1205       Negative  04/01/24 1207       Negative  04/01/24 1207    RPR  Non Reactive  04/29/24 1025    Syphils cascade: TP-Ab (FTA)  Non-Reactive  11/29/24 0212       Non Reactive  09/13/24 1417    TP-Ab  Non-Reactive  11/29/24 0212       Non Reactive  09/13/24 1417    TP-Ab (EIA)       TPPA       HBsAg  Negative  04/29/24 1025       Negative  04/01/24 1208    Herpes Simplex Virus PCR       Herpes Simplex VIrus Culture       HIV  Non Reactive  04/29/24 1025       Non Reactive  04/01/24 1208    Hep C RNA Quant PCR       Hep C Antibody  Non Reactive  04/29/24 1025       Non Reactive  04/01/24 1208    AFP       NIPT       Cystic Fibrosis (Rachel)       Cystic Fibroisis        Spinal Muscular atrophy        Fragile X       Group B Strep  Negative  24 1407    GBS Susceptibility to Clindamycin       GBS Susceptibility to Erythromycin       Fetal Fibronectin       Genetic Testing, Maternal Blood                 Drug Screening       Test Value Date Time    Urine Drug Screen       Amphetamine Screen       Barbiturate Screen       Benzodiazepine Screen       Methadone Screen       Phencyclidine Screen       Opiates Screen       THC Screen       Cocaine Screen       Propoxyphene Screen       Buprenorphine Screen       Methamphetamine Screen       Oxycodone Screen       Tricyclic Antidepressants Screen                 Legend    ^: Historical                             Past OB History:     OB History    Para Term  AB Living   1 0 0 0 0 0   SAB IAB Ectopic Molar Multiple Live Births   0 0 0 0 0 0      # Outcome Date GA Lbr Kd/2nd Weight Sex Type Anes PTL Lv   1 Current                Past Medical History: Past Medical History:   Diagnosis Date    Anemia       Past Surgical History Past Surgical History:   Procedure Laterality Date    EYE SURGERY        Family History: History reviewed. No pertinent family history.   Social History:  reports that she has never smoked. She has never used smokeless tobacco.   reports no history of alcohol use.   reports no history of drug use.         Review of Systems - Negative except per HPI for 10 point review of systems including General, Psychological, Ophthalmic, ENT, Endocrine, Respiratory, Cardiovascular, Gastrointestinal, Genito-Urinary, Musculoskeletal, Neurological, Dermatological      Objective      Vital Signs Range for the last 24 hours  Temperature: Temp:  [97.9 °F (36.6 °C)-98.5 °F (36.9 °C)] 98.5 °F (36.9 °C)   Temp Source: Temp src: Oral   BP: BP: ()/(39-91) 129/77   Pulse: Heart Rate:  [] 112   Respirations: Resp:  [16-18] 18   SPO2: SpO2:  [93 %-100 %] 100 %   O2 Amount (l/min):     O2 Devices     Weight: Weight:  [117 kg (257 lb)-117 kg (257  lb 3.2 oz)] 117 kg (257 lb)     Physical Examination: General appearance - alert, well appearing, and in no distress  Mental status - alert, oriented to person, place, and time  Eyes - sclera anicteric, left eye normal, right eye normal  Chest - no tachypnea, retractions or cyanosis  Heart - normal rate and regular rhythm  Abdomen - soft, nontender, gravid  Pelvic - nl ext genitalia, cervix 4/90/-2, AROM with initially clear fluid.  IUPC placed without resistance. Fluid in IUPC with slight yellow tinge- will continue to monitor  Neurological - alert, oriented, normal speech,   Musculoskeletal - no joint tenderness, deformity or swelling  Extremities - pedal edema trace  Skin - normal coloration and turgor, no rashes, no suspicious skin lesions noted    Presentation: Vertex   Cervix: Exam by: Method: sterile vaginal exam performed (Dr. Gonzalez)   Dilation: Cervical Dilation (cm): 4   Effacement: Cervical Effacement: 90   Station:         Fetal Heart Rate Assessment   Method: Fetal HR Assessment Method: external   Beats/min: Fetal HR (beats/min): 140   Baseline: Fetal HR Baseline: normal range   Varibility: Fetal HR Variability: moderate (amplitude range 6 to 25 bpm)   Accels: Fetal HR Accelerations: greater than/equal to 15 bpm, lasting at least 15 seconds   Decels: Fetal HR Decelerations: variable   Tracing Category:       Uterine Assessment   Method: Method: external tocotransducer, palpation   Frequency (min): Contraction Frequency (Minutes): 2-3   Ctx Count in 10 min:     Duration:     Intensity: Contraction Intensity: moderate by palpation   Intensity by IUPC:     Resting Tone: Uterine Resting Tone: soft by palpation   Resting Tone by IUPC:     [place] Units:       Lab Results   Component Value Date    WBC 15.76 (H) 11/29/2024    HGB 12.8 11/29/2024    HCT 38.5 11/29/2024    MCV 83.7 11/29/2024     11/29/2024      US: Intrauterine pregnancy at 39w6d  Type of study:  Interval growth and BPP  Normal  Interval growth  EFW:63.8%ile, AC:30.7%ile  Fetal position: Vertex  Biophysical profile: Reassuring    JAMES: 11.19 cm   [id number] (8lb)    Assessment & Plan  Assessment:  1.  Intrauterine pregnancy at 40w1d weeks gestation with reactive, reassuring fetal status.    2.  Labor  3.  GBS status: Negative    Plan:  1. IOL:   On pitocin.  AROM this check with IUPC placed after patient consent. Mom and baby tolerated well.  Will check fluid as initially appeared clear but had slight yellow tinge in tubing.  2. Plan of care has been reviewed with patient and .  Risks, benefits of treatment plan have been discussed.  All questions have been answered.      Shannan Gonzalez MD  2024  10:31 EST        Electronically signed by Shannan Gonzalez MD at 24 1034       Facility-Administered Medications as of 2024   Medication Dose Route Frequency Provider Last Rate Last Admin    [COMPLETED] methylergonovine (METHERGINE) injection 200 mcg  200 mcg Intramuscular Once PRN Elza Qiu MD   200 mcg at 24 1445    [] oxytocin (PITOCIN) 30 units in 0.9% sodium chloride 500 mL (premix)  250 mL/hr Intravenous Continuous Elza Qiu  mL/hr at 24 1556 250 mL/hr at 24 1556    [COMPLETED] oxytocin (PITOCIN) 30 units in 0.9% sodium chloride 500 mL (premix)  125 mL/hr Intravenous Once PRN Shannan Gonzalez  mL/hr at 24 1531 125 mL/hr at 24 1531    [COMPLETED] tranexamic acid 1000 mg in 100 mL 0.7% NaCl infusion (premix)  1,000 mg Intravenous Once PRN Elza Qiu MD   1,000 mg at 24 1448     Lab Results (last 72 hours)       Procedure Component Value Units Date/Time    CBC & Differential [[id number]]  (Abnormal) Collected: 24    Specimen: Blood Updated: 24    Narrative:      The following orders were created for panel order CBC & Differential.  Procedure                               Abnormality         Status                     ---------                                -----------         ------                     CBC Auto Differential[[id number]]        Abnormal            Final result                 Please view results for these tests on the individual orders.    CBC Auto Differential [[id number]]  (Abnormal) Collected: 11/30/24 0622    Specimen: Blood Updated: 11/30/24 0702     WBC 16.20 10*3/mm3      RBC 2.85 10*6/mm3      Hemoglobin 7.9 g/dL      Hematocrit 23.6 %      MCV 82.8 fL      MCH 27.7 pg      MCHC 33.5 g/dL      RDW 14.5 %      RDW-SD 43.7 fl      MPV 11.0 fL      Platelets 173 10*3/mm3      Neutrophil % 82.9 %      Lymphocyte % 10.7 %      Monocyte % 5.3 %      Eosinophil % 0.1 %      Basophil % 0.2 %      Immature Grans % 0.8 %      Neutrophils, Absolute 13.42 10*3/mm3      Lymphocytes, Absolute 1.74 10*3/mm3      Monocytes, Absolute 0.86 10*3/mm3      Eosinophils, Absolute 0.01 10*3/mm3      Basophils, Absolute 0.04 10*3/mm3      Immature Grans, Absolute 0.13 10*3/mm3      nRBC 0.0 /100 WBC     Treponema pallidum AB w/Reflex RPR [[id number]]  (Normal) Collected: 11/29/24 0212    Specimen: Blood Updated: 11/29/24 0252     Treponemal AB Total Non-Reactive    Narrative:      Reactive results will reflex RPR testing.    Comprehensive Metabolic Panel [[id number]]  (Abnormal) Collected: 11/29/24 0212    Specimen: Blood Updated: 11/29/24 0245     Glucose 88 mg/dL      BUN 10 mg/dL      Creatinine 0.64 mg/dL      Sodium 135 mmol/L      Potassium 4.0 mmol/L      Comment: Slight hemolysis detected by analyzer. Result may be falsely elevated.        Chloride 104 mmol/L      CO2 18.0 mmol/L      Calcium 9.6 mg/dL      Total Protein 7.4 g/dL      Albumin 3.4 g/dL      ALT (SGPT) 15 U/L      AST (SGOT) 20 U/L      Alkaline Phosphatase 165 U/L      Total Bilirubin <0.2 mg/dL      Globulin 4.0 gm/dL      A/G Ratio 0.9 g/dL      BUN/Creatinine Ratio 15.6     Anion Gap 13.0 mmol/L      eGFR 125.2 mL/min/1.73     Narrative:      GFR Normal >60  Chronic  Kidney Disease <60  Kidney Failure <15      CBC & Differential [[id number]]  (Abnormal) Collected: 11/29/24 0212    Specimen: Blood Updated: 11/29/24 0224    Narrative:      The following orders were created for panel order CBC & Differential.  Procedure                               Abnormality         Status                     ---------                               -----------         ------                     CBC Auto Differential[[id number]]        Abnormal            Final result                 Please view results for these tests on the individual orders.    CBC Auto Differential [[id number]]  (Abnormal) Collected: 11/29/24 0212    Specimen: Blood Updated: 11/29/24 0224     WBC 15.76 10*3/mm3      RBC 4.60 10*6/mm3      Hemoglobin 12.8 g/dL      Hematocrit 38.5 %      MCV 83.7 fL      MCH 27.8 pg      MCHC 33.2 g/dL      RDW 14.4 %      RDW-SD 43.3 fl      MPV 11.2 fL      Platelets 222 10*3/mm3      Neutrophil % 85.4 %      Lymphocyte % 10.0 %      Monocyte % 3.5 %      Eosinophil % 0.1 %      Basophil % 0.3 %      Immature Grans % 0.7 %      Neutrophils, Absolute 13.46 10*3/mm3      Lymphocytes, Absolute 1.58 10*3/mm3      Monocytes, Absolute 0.55 10*3/mm3      Eosinophils, Absolute 0.02 10*3/mm3      Basophils, Absolute 0.04 10*3/mm3      Immature Grans, Absolute 0.11 10*3/mm3      nRBC 0.0 /100 WBC           Imaging Results (Last 72 Hours)       ** No results found for the last 72 hours. **          ECG/EMG Results (last 72 hours)       ** No results found for the last 72 hours. **          Orders (last 72 hrs)        Start     Ordered    12/01/24 1114  Discontinue IV  Once,   Status:  Canceled         12/01/24 1113 12/01/24 1113  Discharge patient  Once         12/01/24 1113    12/01/24 0000  ferrous sulfate (FeroSul) 325 (65 FE) MG tablet  Daily With Breakfast         12/01/24 1113    12/01/24 0000  docusate sodium 100 MG capsule  2 Times Daily         12/01/24 1113    12/01/24 0000  ibuprofen  (ADVIL,MOTRIN) 600 MG tablet  Every 6 Hours PRN         24 1113    24 0000  Discharge Instructions        Comments: Discharge instructions reviewed including but not limited to: Preeclampsia precautions (return with headache, visual changes, right upper quadrant pain, concern for elevated blood pressures, other concerning symptoms), infection precautions (redness around incision, purulent drainage, temp of 100.4 or greater), heavy vaginal bleeding, chest pain, shortness of breath at rest, unilateral leg swelling or calf pain, or other concerning signs or symptoms    24 1113    24 0000  Driving Restrictions        Comments: No driving while too sore to twist or push break.  Encourage to not drive for at least 4 weeks following  section    24 1113    24 0000  Diet: Regular/House Diet; Regular Texture (IDDSI 7); Thin (IDDSI 0)         24 1113    24 0000  Pelvic Rest        Comments: 6 weeks    24 1113    24 0000  Discharge Follow-up with Specified Provider: OBGYN; 6 Weeks         24 1113    24 0000  Call MD With Problems / Concerns        Comments: Instructions: Discharge instructions reviewed including but not limited to: Preeclampsia precautions (return with headache, visual changes, right upper quadrant pain, concern for elevated blood pressures, other concerning symptoms), infection precautions (redness around incision, purulent drainage, temp of 100.4 or greater), return with any chest pain, shortness of breath at rest, unilateral leg swelling or calf pain, or other concerning signs or symptoms.  Patient was instructed to monitor mood and that if she feels symptoms of depression she should seek medical attention.  If thoughts of harming self or others arise, she should be seen immediately by a medical professional.    24 1113    24 1745  ferrous sulfate tablet 325 mg  Daily With Breakfast,   Status:  Discontinued          11/30/24 1651    11/30/24 1107  Obtain Informed Consent  Once,   Status:  Canceled         11/30/24 1106    11/30/24 0900  prenatal vitamin tablet 1 tablet  Daily,   Status:  Discontinued         11/29/24 1737    11/30/24 0800  Sitz Bath  3 Times Daily,   Status:  Canceled        Comments: PRN    11/29/24 1737    11/30/24 0600  CBC & Differential  Morning Draw        Comments: Postpartum Day 1      11/29/24 1737    11/30/24 0600  Hemoglobin & Hematocrit, Blood  Morning Draw,   Status:  Canceled        Comments: Postpartum Day 1      11/29/24 1737    11/30/24 0600  CBC Auto Differential  PROCEDURE ONCE        Comments: Postpartum Day 1      11/29/24 2202    11/30/24 0000  bisacodyl (DULCOLAX) suppository 10 mg  Daily PRN,   Status:  Discontinued         11/29/24 1737    11/29/24 2100  docusate sodium (COLACE) capsule 100 mg  2 Times Daily,   Status:  Discontinued         11/29/24 1737    11/29/24 1738  Code Status and Medical Interventions: CPR (Attempt to Resuscitate); Full  Continuous,   Status:  Canceled         11/29/24 1737    11/29/24 1738  Vital Signs Per Hospital Policy  Per Hospital Policy,   Status:  Canceled         11/29/24 1737    11/29/24 1738  Notify Physician  Until Discontinued,   Status:  Canceled         11/29/24 1737    11/29/24 1738  Up Ad Nancy  Until Discontinued,   Status:  Canceled         11/29/24 1737    11/29/24 1738  Ambulate Patient  Every Shift,   Status:  Canceled       11/29/24 1737    11/29/24 1738  Patient May Shower  Once,   Status:  Canceled         11/29/24 1737    11/29/24 1738  Diet: Regular/House; Fluid Consistency: Thin (IDDSI 0)  Diet Effective Now,   Status:  Canceled         11/29/24 1737    11/29/24 1738  Advance Diet As Tolerated -Regular  Until Discontinued,   Status:  Canceled         11/29/24 1737    11/29/24 1738  Fundal and Lochia Check  Per Hospital Policy,   Status:  Canceled        Comments: Q 15 min x 4, Q 30 min x 2, then Q Shift    11/29/24 1737    11/29/24 1731   RN to Assess Rh Status & Place RhIG Evaluation Order if Indicated  Continuous,   Status:  Canceled         11/29/24 1737    11/29/24 1738  Bladder Assessment  Per Order Details,   Status:  Canceled        Comments: Postpartum 1) Upon Admission to Unit & Every 4 Hours PRN Until Voiding. 2) Out of Bed to Void in 8 Hours.    11/29/24 1737    11/29/24 1738  Straight Cath  Per Order Details,   Status:  Canceled        Comments: Postpartum: If Distended & Unable to Void, May Repeat Once.    11/29/24 1737    11/29/24 1738  Indwelling Urinary Catheter  Per Order Details,   Status:  Canceled        Comments: Postpartum : After Straight Cathed x2 or if Greater Than 1000mL Residual, Insert Indwelling Urinary Catheter Until Further MD Order.    11/29/24 1737 11/29/24 1738  Breast pump to bed  Once,   Status:  Canceled         11/29/24 1737    11/29/24 1738  If indicated -- Please administer RH Immunoglobulin based on results of cord blood evaluation and fetal screen lab tests, pharmacy to dispense  Continuous,   Status:  Canceled        Comments: See process instructions for reference range details.    11/29/24 1737    11/29/24 1738  Place Sequential Compression Device  Once,   Status:  Canceled         11/29/24 1737    11/29/24 1738  Maintain Sequential Compression Device  Continuous,   Status:  Canceled         11/29/24 1737    11/29/24 1737  magnesium hydroxide (MILK OF MAGNESIA) suspension 10 mL  Daily PRN,   Status:  Discontinued         11/29/24 1737    11/29/24 1737  all purpose nipple ointment  4 Times Daily PRN,   Status:  Discontinued         11/29/24 1737    11/29/24 1737  benzocaine-menthol (DERMOPLAST) 20-0.5 % topical spray  As Needed,   Status:  Discontinued         11/29/24 1737    11/29/24 1737  Hydrocort-Pramoxine (Perianal) (PROCTOFOAM-HS) 1-1 % rectal foam 1 Application  As Needed,   Status:  Discontinued         11/29/24 1737 11/29/24 1737  Hydrocortisone (Perianal) (ANUSOL-HC) 2.5 % rectal cream 1  Application  As Needed,   Status:  Discontinued         11/29/24 1737    11/29/24 1737  benzocaine (AMERICAINE) 20 % rectal ointment 1 Application  As Needed,   Status:  Discontinued         11/29/24 1737    11/29/24 1737  ondansetron ODT (ZOFRAN-ODT) disintegrating tablet 4 mg  Every 8 Hours PRN,   Status:  Discontinued         11/29/24 1737    11/29/24 1737  calcium carbonate (TUMS) chewable tablet 500 mg (200 mg elemental)  3 Times Daily PRN,   Status:  Discontinued         11/29/24 1737    11/29/24 1737  traMADol (ULTRAM) tablet 50 mg  Every 6 Hours PRN,   Status:  Discontinued         11/29/24 1737    11/29/24 1737  hydrOXYzine (ATARAX) tablet 50 mg  Nightly PRN,   Status:  Discontinued         11/29/24 1737    11/29/24 1737  Apply Ice to Perineum  As Needed,   Status:  Canceled      Comments: For 20 min q 2 hrs    11/29/24 1737    11/29/24 1737  Waffle Cushion  As Needed,   Status:  Canceled      Comments: For perineal discomfort    11/29/24 1737    11/29/24 1737  Donut Ring  As Needed,   Status:  Canceled      Comments: For perineal pain    11/29/24 1737    11/29/24 1737  Kpad  As Needed,   Status:  Canceled      Comments: For pain    11/29/24 1737    11/29/24 1737  Apply witch hazel pads / TUCKS to perineum as needed for comfort PRN  As Needed,   Status:  Canceled       11/29/24 1737    11/29/24 1737  Warm compress  As Needed,   Status:  Canceled       11/29/24 1737    11/29/24 1737  Apply ace wrap, tight bra, or binder  As Needed,   Status:  Canceled       11/29/24 1737    11/29/24 1737  Apply ice packs  As Needed,   Status:  Canceled       11/29/24 1737    11/29/24 1737  sodium chloride 0.9 % flush 1-10 mL  As Needed,   Status:  Discontinued         11/29/24 1737    11/29/24 1737  acetaminophen (TYLENOL) tablet 650 mg  Every 6 Hours PRN,   Status:  Discontinued         11/29/24 1737    11/29/24 1719  Remove vaginal packing  Once,   Status:  Canceled         11/29/24 1519    11/29/24 1719  Discontinue  "Indwelling Urinary Catheter  Once,   Status:  Canceled         11/29/24 1519    11/29/24 1645  ibuprofen (ADVIL,MOTRIN) tablet 600 mg  Every 6 Hours PRN,   Status:  Discontinued         11/29/24 1645    11/29/24 1545  oxytocin (PITOCIN) 30 units in 0.9% sodium chloride 500 mL (premix)  Continuous        Placed in \"Followed by\" Linked Group    11/29/24 1432    11/29/24 1527  Transfer to postpartum when discharge criteria met.  Until Discontinued,   Status:  Canceled         11/29/24 1526    11/29/24 1526  oxytocin (PITOCIN) 30 units in 0.9% sodium chloride 500 mL (premix)  Once As Needed         11/29/24 1526    11/29/24 1520  Notify Provider if Bladder Distention Continues  Continuous,   Status:  Canceled        Comments: Open Order Report to View Parameters Requiring Provider Notification    11/29/24 1519    11/29/24 1520  Consult Pharmacist For Review of Medications That May Cause Urinary Retention - RN To Place Order for Consult it Needed  Continuous,   Status:  Canceled         11/29/24 1519    11/29/24 1519  If Bladder Scan Volume is Less Than 500mL & Patient is Without Symptoms of Bladder Discomfort / Distention Monitor Every 1-2 Hours for Spontaneous Void  As Needed,   Status:  Canceled       11/29/24 1519    11/29/24 1519  Straight Cath Every 4-6 Hours As Needed If Patient is Unable to Void After 4-6 Hours, Bladder Scan Volume is Greater Than 500mL & Patient Has Symptoms of Bladder Discomfort / Distention  As Needed,   Status:  Canceled       11/29/24 1519    11/29/24 1519  Schedule / Prompt Voiding For Patients With Urinary Incontinence  As Needed,   Status:  Canceled       11/29/24 1519    11/29/24 1519  Bladder Scan if Patient Unable to Void 4-6 Hours After Catheter Removal  As Needed,   Status:  Canceled         11/29/24 1519    11/29/24 1519  Transfer Patient  Once         11/29/24 1519    11/29/24 1447  tranexamic acid 1000 mg in 100 mL 0.7% NaCl infusion (premix)  Once As Needed         11/29/24 1447 " "   11/29/24 1433  Notify Provider (Specified)  Until Discontinued,   Status:  Canceled         11/29/24 1432    11/29/24 1433  Vital Signs Per Hospital Policy  Per Hospital Policy,   Status:  Canceled         11/29/24 1432    11/29/24 1433  Fundal & Lochia Check  Per Order Details,   Status:  Canceled        Comments: Every 15 Minutes x4, Then Every 30 Minutes x2, Then Every Shift    11/29/24 1432    11/29/24 1433  Fundal & Lochia Check  Every Shift,   Status:  Canceled       11/29/24 1432    11/29/24 1433  Diet: Regular/House; Fluid Consistency: Thin (IDDSI 0)  Diet Effective Now,   Status:  Canceled         11/29/24 1432    11/29/24 1433  Advance Diet As Tolerated -  Until Discontinued,   Status:  Canceled         11/29/24 1432    11/29/24 1432  oxytocin (PITOCIN) 30 units in 0.9% sodium chloride 500 mL (premix)  Once,   Status:  Discontinued        Placed in \"Followed by\" Linked Group    11/29/24 1432    11/29/24 1432  carboprost (HEMABATE) injection 250 mcg  Every 15 Minutes PRN,   Status:  Discontinued         11/29/24 1432    11/29/24 1432  miSOPROStol (CYTOTEC) tablet 800 mcg  Once As Needed,   Status:  Discontinued         11/29/24 1432    11/29/24 1432  methylergonovine (METHERGINE) injection 200 mcg  Once As Needed         11/29/24 1432    11/29/24 0900  sodium chloride 0.9 % flush 10 mL  Every 12 Hours Scheduled,   Status:  Discontinued         11/29/24 0206    11/29/24 0645  oxytocin (PITOCIN) 30 units in 0.9% sodium chloride 500 mL (premix)  Titrated,   Status:  Discontinued         11/29/24 0550    11/29/24 0515  fentaNYL 2mcg/mL and ropivacaine 0.2% in NS epidural 100mL  Continuous,   Status:  Discontinued         11/29/24 0429    11/29/24 0430  Urinary Catheter Care  Every Shift,   Status:  Canceled      Placed in \"And\" Linked Group    11/29/24 0429    11/29/24 0429  ePHEDrine injection 5 mg  Every 15 Minutes PRN,   Status:  Discontinued         11/29/24 0429 11/29/24 0429  ondansetron (ZOFRAN) " "injection 4 mg  Once As Needed,   Status:  Discontinued         11/29/24 0429 11/29/24 0429  famotidine (PEPCID) injection 20 mg  Once As Needed,   Status:  Discontinued         11/29/24 0429 11/29/24 0429  diphenhydrAMINE (BENADRYL) injection 12.5 mg  Every 8 Hours PRN,   Status:  Discontinued         11/29/24 0429 11/29/24 0300  lactated ringers infusion  Continuous,   Status:  Discontinued         11/29/24 0206 11/29/24 0204  famotidine (PEPCID) injection 20 mg  2 Times Daily PRN,   Status:  Discontinued        Placed in \"Or\" Linked Group    11/29/24 0206 11/29/24 0204  famotidine (PEPCID) tablet 20 mg  2 Times Daily PRN,   Status:  Discontinued        Placed in \"Or\" Linked Group    11/29/24 0206 11/29/24 0204  Position Change - For Intra-Uterine Resusitation for Hypertonus, HyperStimulation or Non-Reassuring Fetal Status  As Needed,   Status:  Canceled       11/29/24 0206 11/29/24 0204  sodium chloride 0.9 % flush 10 mL  As Needed,   Status:  Discontinued         11/29/24 0206 11/29/24 0204  sodium chloride 0.9 % infusion 40 mL  As Needed,   Status:  Discontinued         11/29/24 0206 11/29/24 0204  lidocaine (XYLOCAINE) 1 % injection 0.5 mL  Once As Needed,   Status:  Discontinued         11/29/24 0206 11/29/24 0204  lactated ringers bolus 1,000 mL  Once As Needed,   Status:  Discontinued         11/29/24 0206 11/29/24 0204  acetaminophen (TYLENOL) tablet 650 mg  Every 4 Hours PRN,   Status:  Discontinued         11/29/24 0206 11/29/24 0204  butorphanol (STADOL) injection 2 mg  Every 3 Hours PRN,   Status:  Discontinued         11/29/24 0206 11/29/24 0204  ondansetron ODT (ZOFRAN-ODT) disintegrating tablet 4 mg  Every 6 Hours PRN,   Status:  Discontinued        Placed in \"Or\" Linked Group    11/29/24 0206 11/29/24 0204  ondansetron (ZOFRAN) injection 4 mg  Every 6 Hours PRN,   Status:  Discontinued        Placed in \"Or\" Linked Group    11/29/24 0206 11/29/24 0204  " terbutaline (BRETHINE) injection 0.25 mg  As Needed,   Status:  Discontinued         24  mineral oil liquid 30 mL  Once As Needed,   Status:  Discontinued         24                     Operative/Procedure Notes (last 72 hours)        Shannan Gonzalez MD at 24 1520           [facility]   Vaginal Delivery Note    Patient Name: Azeb Keller  : 2000  MRN: [id number]    Date of Delivery: 2024    Diagnosis     Pre & Post-Delivery:  Intrauterine pregnancy at 40w1d  Labor status: Spontaneous Onset of Labor    * No active hospital problems. *             Problem List    Transfer to Postpartum     Review the Delivery Report for details.     Delivery     Delivery: Vaginal, Spontaneous    YOB: 2026   Time of Birth:  Gestational Age 2:35 PM  40w1d     Anesthesia: Epidural;Local    Delivering clinician: Shannan Gonzalez   Forceps?   No   Vacuum? No    Shoulder dystocia present: No        Delivery narrative:    Preop diagnosis:  24 y.o.  year old  at 40w1d      Spontaneous labor  Postop diagnosis: Same    Indications: Azeb Keller is a 24 y.o.  who presented at 40w1d with labor. She was augmented with pitocin and AROM.  An IUPC was placed. She then progressed along a normal labor curve to complete dilation.  She pushed for a little over an hour with good maternal effort.    Findings: Male infant, Apgar 8/9, Weight pending; second degree perineal, bilateral vaginal sulcal lacerations noted and repaired; Placenta intact, marginal cord insertion noted    QBL:      Procedure:The cervix was noted to be completely dilated, completely effaced, and +3 station. Under continuous electronic fetal heart rate monitoring, the patient was encouraged to push.  Over the course of several contractions the fetal station remained +3 without  and then precipitously with good maternal effort she delivered through one contraction a vigorous, viable male infant.  The head presented in the OA presentation and restituted to maternal right. There was a single loose nuchal cord present which was reduced. The right anterior fetal shoulder was then easily delivered with a gentle downward motion followed by the posterior fetal shoulder, followed by the remainder of the infant without difficulty. The infant was immediately vigorous. The cord was clamped around 90 seconds following delivery. The cord was cut and the infant was placed skin to skin. Cord blood was then collected. The placenta then delivered spontaneously intact, and a three vessel cord was noted. Uterine massage and IV Pitocin were given; bleeding was noted and IM methergine and TXA were given along with uterine massage until the fundus was firm. The cervix, vagina, perineum, and rectum were carefully inspected for lacerations and bilateral sulcal lacerations, a second degree perineal were noted. The patient could feel pain so 20mL of local anesthetic was given along the lacerations.  These were repaired with 3-0 vicryl in standard fashion. Hemostasis was obtained, but there was still some oozing from areas of abrasions along the sidewalls. With pressure, this improved so a petersen catheter was placed and packing was placed.  This will be removed after recovery.  Patient was counseled on findings. Counts for needles, sponges, laps and instruments were correct times two at the end of the delivery. There were no sponges left in the vagina. There were no major complications. Mother and baby were bonding well at the end of the delivery.            Infant     Findings: male infant     Infant observations: Weight: No birth weight on file.  Length:   in  Observations/Comments:  Adarsh Rm 4     Apgars: 8  @ 1 minute /    9  @ 5 minutes   Infant Name: Kendall     Placenta & Cord         Placenta delivered  Spontaneous at   11/29/2024  2:41 PM    Cord: 3 vessels present.   Nuchal Cord?  yes; Number of nuchal loops present:      Cord  "blood obtained: Yes   Cord gases obtained:  No   Cord gas results: Venous:  No results found for: \"PHCVEN\", \"BECVEN\"    Arterial:  No results found for: \"PHCART\", \"BECART\"     Repair     Episiotomy: None    No    Lacerations: Yes  Laceration Information  Laceration Repaired?   Perineal: 2nd Yes   Periurethral:       Labial:       Sulcus:       Vaginal:       Cervical:         Suture used for repair: 3-0 Vicryl  Laceration Length for 3rd or 4th degree lacerations: N/a   Estimated Blood Loss:       Quantitative Blood Loss:          Complications     Postpartum hemorrhage secondary atony and lacerations    Disposition     Mother to Mother Baby/Postpartum  in stable condition currently.  Baby to remains with mom  in stable condition currently.    Shannan Gonzalez MD  11/29/24  15:20 EST          Electronically signed by Shannan Gonzalez MD at 11/29/24 1528          Physician Progress Notes (last 72 hours)        Shannan Gonzalez MD at 12/01/24 1022          Postpartum Progress Note      Status post Vaginal Delivery: Doing well postoperatively.     1) postpartum care immediately following delivery :    Routine care, plan for discharge today  2) Postpartum anemia:   HgB 7.9 from 12.8, PP hemorrhage secondary to atony and lacerations.  Treated with repair and IV pitocin, IM methergine, TXA   - pt counseled on PPD1 and desired PO Iron. To continue at discharge    Rh status: Positive  Rubella: Immune   Admission syphilis testing: Non-reactive  Gender: male, desires circumcision. Called by RN that nursery team spoke to Urology and baby is cleared for circumcision.  Mom aware of risks/benefits, elective nature of procedure.    Discharge instructions reviewed including but not limited to: Preeclampsia precautions (return with headache, visual changes, right upper quadrant pain, concern for elevated blood pressures, other concerning symptoms), infection precautions (redness around incision, purulent drainage, temp of 100.4 or " greater), return with any chest pain, shortness of breath at rest, unilateral leg swelling or calf pain, or other concerning signs or symptoms.  Patient was instructed to monitor mood and that if she feels symptoms of depression she should seek medical attention.  If thoughts of harming self or others arise, she should be seen immediately by a medical professional.  She will call if she needs more than Ibuprofen or tylenol for pain control      Subjective     Postpartum Day 2: Vaginal delivery    The patient feels well. The patient denies emotional concerns. Pain is well controlled with current medications; she took one dose of tramadol this morning for back pain but is unsure if it helped and would like to go home with just ibuprofen. The baby is well. The patient is ambulating well. The patient is tolerating a normal diet.     Objective     Vital signs in last 24 hours:  Temp:  [97.6 °F (36.4 °C)-98.1 °F (36.7 °C)] 97.6 °F (36.4 °C)  Heart Rate:  [] 98  Resp:  [16-18] 16  BP: (104-122)/(66-77) 114/66      General:    alert, appears stated age, and cooperative   CV: Well perfused   Lungs: No respiratory distress   Abdomen:  Soft, Non-tender    Lochia:  appropriate   Uterine Fundus:   firm   Ext    Edema trace   DVT Evaluation:  No evidence of DVT seen on physical exam.     Lab Results   Component Value Date    WBC 16.20 (H) 11/30/2024    HGB 7.9 (L) 11/30/2024    HCT 23.6 (L) 11/30/2024    MCV 82.8 11/30/2024     11/30/2024       Shannan Gonzalez MD  12/1/2024  10:22 EST        Electronically signed by Shannan Gonzalez MD at 12/01/24 1211       Elza Qiu MD at 11/30/24 1026          Postpartum Progress Note      Status post Vaginal Delivery: Doing well postoperatively.     1) postpartum care immediately following delivery :   - Continue routine postpartum care.  - Anticipate discharge home tomorrow on PPD#2.     2) Postpartum anemia  - Hgb 12.8 on admission--> Hgb 7.9 on PPD#1  - Patient had  postpartum hemorrhage secondary to atony and lacerations- required IV pitocin, IM methergine, TXA  -  cc  - We discussed option of IV iron versus oral iron supplementation and risks and benefits of each option. Patient would like to continue oral iron supplementation.     Rh status: B positive   Rubella: Immune  Gender: Male infant - desires male circumcision. Reviewed the elective procedure and risks including but not limited to bleeding, infection, damage to surrounding structures that could require additional surgery or revision, and cosmesis. Consents signed. Pediatrics has since evaluated the baby and recommended deferral until tomorrow at least as they are concern with deviation of the [place]. Possible urology referral needed.   T. Pallidum Antibody: NR on admission     Subjective     Postpartum Day 1: Vaginal delivery    The patient feels well. The patient denies emotional concerns. Pain is well controlled with current medications. The baby is well. The patient is ambulating well. The patient is tolerating a normal diet. She reports vaginal bleeding is lessening. She is voiding without difficulty. She has passed flatus.     Objective     Vital signs in last 24 hours:  Temp:  [97.5 °F (36.4 °C)-100 °F (37.8 °C)] 98.7 °F (37.1 °C)  Heart Rate:  [108-152] 108  Resp:  [16-18] 16  BP: ()/() 109/71      General:    alert, appears stated age, and cooperative   Lungs:  No increased work of breathing, regular respirations    Abdomen:  Soft, Non-tender    Lochia:  appropriate   Uterine Fundus:   firm   Ext    Trace lower extremity bilaterally    DVT Evaluation:  No evidence of DVT seen on physical exam.     Lab Results   Component Value Date    WBC 16.20 (H) 11/30/2024    HGB 7.9 (L) 11/30/2024    HCT 23.6 (L) 11/30/2024    MCV 82.8 11/30/2024     11/30/2024       Elza Qiu MD  11/30/2024  10:27 EST        Electronically signed by Elza Qiu MD at 11/30/24 4631       Consult Notes  (last 72 hours)  Notes from 11/29/24 1202 through 12/02/24 1202   No notes of this type exist for this encounter.

## 2024-12-10 ENCOUNTER — MATERNAL SCREENING (OUTPATIENT)
Dept: CALL CENTER | Facility: HOSPITAL | Age: 24
End: 2024-12-10
Payer: COMMERCIAL

## 2024-12-10 RX ORDER — IBUPROFEN 600 MG/1
600 TABLET, FILM COATED ORAL EVERY 6 HOURS PRN
Qty: 30 TABLET | Refills: 0 | Status: SHIPPED | OUTPATIENT
Start: 2024-12-10

## 2024-12-10 NOTE — OUTREACH NOTE
Maternal Screening Survey      Flowsheet Row Responses   Facility patient discharged from? [facility]   Attempt successful? No   Unsuccessful attempts Attempt 1              Yoav PIERRE - Registered Nurse

## 2024-12-10 NOTE — OUTREACH NOTE
Maternal Screening Survey      Flowsheet Row Responses   Facility patient discharged from? [facility]   Attempt successful? Yes   Call start time 1157   Call end time 1201   Person spoke with today (if not patient) and relationship patient   I have been able to laugh and see the funny side of things. 0   I have looked forward with enjoyment to things. 0   I have blamed myself unnecessarily when things went wrong. 0   I have been anxious or worried for no good reason. 0   I have felt scared or panicky for no good reason. 0   Things have been getting on top of me. 0   I have been so unhappy that I have had difficulty sleeping. 0   I have felt sad or miserable. 0   I have been so unhappy that I have been crying. 0   The thought of harming myself has occurred to me. 0   [place]  Depression Scale Total 0   Did any of your parents have problems with alcohol or drug use? No   Do any of your peers have problems with alcohol or drug use? No   Does your partner have problems with alcohol or drug use? No   Before you were pregnant did you have problems with alcohol or drug use? (past) No   In the past month, did you drink beer, wine, liquor or use any other drugs? (pregnancy) No   Maternal Screening call completed Yes   Call end time 1201              Yoav PIERRE - Registered Nurse

## 2025-01-17 ENCOUNTER — POSTPARTUM VISIT (OUTPATIENT)
Dept: OBSTETRICS AND GYNECOLOGY | Facility: CLINIC | Age: 25
End: 2025-01-17

## 2025-01-17 VITALS
WEIGHT: 236 LBS | HEIGHT: 67 IN | SYSTOLIC BLOOD PRESSURE: 122 MMHG | DIASTOLIC BLOOD PRESSURE: 79 MMHG | BODY MASS INDEX: 37.04 KG/M2 | HEART RATE: 99 BPM

## 2025-01-17 DIAGNOSIS — K59.00 CONSTIPATION, UNSPECIFIED CONSTIPATION TYPE: ICD-10-CM

## 2025-01-17 DIAGNOSIS — Z30.09 ENCOUNTER FOR OTHER GENERAL COUNSELING OR ADVICE ON CONTRACEPTION: ICD-10-CM

## 2025-01-17 DIAGNOSIS — G89.29 CHRONIC THORACIC BACK PAIN, UNSPECIFIED BACK PAIN LATERALITY: ICD-10-CM

## 2025-01-17 DIAGNOSIS — M54.6 CHRONIC THORACIC BACK PAIN, UNSPECIFIED BACK PAIN LATERALITY: ICD-10-CM

## 2025-01-17 NOTE — PROGRESS NOTES
"Chief Complaint  Postpartum Care- 6 wk pp exam    Subjective        Azeb Keller presents to [facility]  History of Present Illness  Patient is a 25-year-old that presents for 6-week postpartum visit.  She had an uncomplicated pregnancy and had a vaginal delivery at 40 weeks of a 9 pound 6 ounce baby boy.  She reports that she has been having a lot of upper back pain since delivery.  She also reports issues with constipation.  She is not using any pain medication.  She has stopped bleeding.  She is breast and formula feeding.  She denies any concerns for postpartum depression or anxiety.  Objective   Vital Signs:  /79   Pulse 99   Ht 170.2 cm (67\")   Wt 107 kg (236 lb)   BMI 36.96 kg/m²   Estimated body mass index is 36.96 kg/m² as calculated from the following:    Height as of this encounter: 170.2 cm (67\").    Weight as of this encounter: 107 kg (236 lb).          Physical Exam  Vitals reviewed. Exam conducted with a chaperone present.   Constitutional:       Appearance: She is well-developed.   Cardiovascular:      Rate and Rhythm: Normal rate and regular rhythm.   Pulmonary:      Effort: Pulmonary effort is normal.      Breath sounds: Normal breath sounds.   Chest:   Breasts:     Right: No inverted nipple, mass, nipple discharge, skin change or tenderness.      Left: No inverted nipple, mass, nipple discharge, skin change or tenderness.   Abdominal:      General: There is no distension.      Palpations: Abdomen is soft.      Tenderness: There is no abdominal tenderness.   Genitourinary:     Labia:         Right: No rash, tenderness, lesion or injury.         Left: No rash, tenderness, lesion or injury.       Vagina: Normal.      Cervix: Normal.      Uterus: Normal.       Adnexa:         Right: No mass, tenderness or fullness.          Left: No mass, tenderness or fullness.     Neurological:      Mental Status: She is alert.        Result Review :                Assessment and " Plan   Diagnoses and all orders for this visit:    1. Routine postpartum follow-up (Primary)    2. Chronic thoracic back pain, unspecified back pain laterality    3. Constipation, unspecified constipation type    4. Encounter for other general counseling or advice on contraception    She is doing well postpartum and may resume all normal activities with no restrictions.  I discussed with patient my recommendations for physical therapy for her upper back pain.  I discussed with her many factors that contribute to postpartum back pain.  At this time, she declines a referral to physical therapy and would like to try some stretching at home.  She was advised she may call at any time for referral.  I also discussed MiraLAX and dietary changes to help with constipation and she states she does notice that certain foods do keep her regular.  She was counseled on different forms of birth control that she may use while breast-feeding and was given handout information on the IUDs and Nexplanon.  She will call back when she decides on which form of birth control.         Follow Up   No follow-ups on file.  Patient was given instructions and counseling regarding her condition or for health maintenance advice. Please see specific information pulled into the AVS if appropriate.

## 2025-01-21 ENCOUNTER — TELEPHONE (OUTPATIENT)
Dept: OBSTETRICS AND GYNECOLOGY | Facility: CLINIC | Age: 25
End: 2025-01-21

## 2025-01-21 NOTE — TELEPHONE ENCOUNTER
Provider:  DR TORRES     Caller: ESCOBAR RUANO    Phone Number: [phone]     Reason for Call: PATIENT WAS CALLING TO CHECK THE STATUS OF HER [facility]MA PAPERWORK AND THE REFERRAL FOR PHYSICAL THERAPY FOR HER BACK PAIN//PLEASE FOLLOW UP

## 2025-04-25 ENCOUNTER — TELEPHONE (OUTPATIENT)
Dept: OBSTETRICS AND GYNECOLOGY | Facility: CLINIC | Age: 25
End: 2025-04-25
Payer: COMMERCIAL

## 2025-06-02 ENCOUNTER — TELEPHONE (OUTPATIENT)
Dept: OBSTETRICS AND GYNECOLOGY | Facility: CLINIC | Age: 25
End: 2025-06-02
Payer: COMMERCIAL

## 2025-06-02 DIAGNOSIS — Z32.01 POSITIVE PREGNANCY TEST: ICD-10-CM

## 2025-06-02 RX ORDER — VITAMIN A, VITAMIN C, VITAMIN D, VITAMIN E, THIAMINE, RIBOFLAVIN, NIACIN, VITAMIN B6, FOLIC ACID, VITAMIN B12, CALCIUM, IRON, ZINC, COPPER 4000; 120; 400; 22; 1.84; 3; 20; 10; 1; 12; 200; 27; 25; 2 [IU]/1; MG/1; [IU]/1; [IU]/1; MG/1; MG/1; MG/1; MG/1; MG/1; UG/1; MG/1; MG/1; MG/1; MG/1
1 TABLET ORAL DAILY
Qty: 90 TABLET | Refills: 3 | Status: SHIPPED | OUTPATIENT
Start: 2025-06-02

## 2025-06-02 RX ORDER — FERROUS SULFATE 325(65) MG
1 TABLET ORAL
Qty: 90 TABLET | Refills: 1 | Status: SHIPPED | OUTPATIENT
Start: 2025-06-02

## 2025-06-02 NOTE — TELEPHONE ENCOUNTER
Luci established NEW OB Pt is 6w0d is scheduled for NEW OB appt on 7/16 @ 1:00 with . NEW OB Pt is requesting prescription for previous medications of ferrous sulfate (FeroSul) 325 (65 FE) MG tablet and prenatal vitamin tablet 1 tablet.

## 2025-06-04 ENCOUNTER — HOSPITAL ENCOUNTER (EMERGENCY)
Facility: HOSPITAL | Age: 25
Discharge: HOME | End: 2025-06-04
Attending: STUDENT IN AN ORGANIZED HEALTH CARE EDUCATION/TRAINING PROGRAM
Payer: MEDICAID

## 2025-06-04 VITALS
TEMPERATURE: 98.9 F | RESPIRATION RATE: 16 BRPM | SYSTOLIC BLOOD PRESSURE: 132 MMHG | OXYGEN SATURATION: 99 % | HEART RATE: 97 BPM | DIASTOLIC BLOOD PRESSURE: 75 MMHG

## 2025-06-04 DIAGNOSIS — L02.215 PERINEAL ABSCESS: Primary | ICD-10-CM

## 2025-06-04 DIAGNOSIS — Z3A.01 LESS THAN 8 WEEKS GESTATION OF PREGNANCY: ICD-10-CM

## 2025-06-04 DIAGNOSIS — D72.829 LEUKOCYTOSIS, UNSPECIFIED TYPE: ICD-10-CM

## 2025-06-04 LAB
ALBUMIN SERPL-MCNC: 4.2 G/DL (ref 3.5–5.2)
ALBUMIN/GLOB SERPL: 0.9 G/DL
ALP SERPL-CCNC: 105 U/L (ref 39–117)
ALT SERPL W P-5'-P-CCNC: 14 U/L (ref 1–33)
ANION GAP SERPL CALCULATED.3IONS-SCNC: 12.9 MMOL/L (ref 5–15)
AST SERPL-CCNC: 15 U/L (ref 1–32)
BASOPHILS # BLD AUTO: 0.03 10*3/MM3 (ref 0–0.2)
BASOPHILS NFR BLD AUTO: 0.2 % (ref 0–1.5)
BILIRUB SERPL-MCNC: 0.3 MG/DL (ref 0–1.2)
BUN SERPL-MCNC: 6 MG/DL (ref 6–20)
BUN/CREAT SERPL: 8.7 (ref 7–25)
CALCIUM SPEC-SCNC: 9.5 MG/DL (ref 8.6–10.5)
CHLORIDE SERPL-SCNC: 104 MMOL/L (ref 98–107)
CO2 SERPL-SCNC: 21.1 MMOL/L (ref 22–29)
CREAT SERPL-MCNC: 0.69 MG/DL (ref 0.57–1)
D-LACTATE SERPL-SCNC: 0.9 MMOL/L (ref 0.5–2)
DEPRECATED RDW RBC AUTO: 46 FL (ref 37–54)
EGFRCR SERPLBLD CKD-EPI 2021: 122.2 ML/MIN/1.73
EOSINOPHIL # BLD AUTO: 0.09 10*3/MM3 (ref 0–0.4)
EOSINOPHIL NFR BLD AUTO: 0.6 % (ref 0.3–6.2)
ERYTHROCYTE [DISTWIDTH] IN BLOOD BY AUTOMATED COUNT: 17 % (ref 12.3–15.4)
GLOBULIN UR ELPH-MCNC: 4.7 GM/DL
GLUCOSE SERPL-MCNC: 88 MG/DL (ref 65–99)
HCG INTACT+B SERPL-ACNC: NORMAL MIU/ML
HCT VFR BLD AUTO: 37 % (ref 34–46.6)
HGB BLD-MCNC: 10.6 G/DL (ref 12–15.9)
IMM GRANULOCYTES # BLD AUTO: 0.07 10*3/MM3 (ref 0–0.05)
IMM GRANULOCYTES NFR BLD AUTO: 0.5 % (ref 0–0.5)
LYMPHOCYTES # BLD AUTO: 2.12 10*3/MM3 (ref 0.7–3.1)
LYMPHOCYTES NFR BLD AUTO: 14.4 % (ref 19.6–45.3)
MCH RBC QN AUTO: 21.8 PG (ref 26.6–33)
MCHC RBC AUTO-ENTMCNC: 28.6 G/DL (ref 31.5–35.7)
MCV RBC AUTO: 76 FL (ref 79–97)
MONOCYTES # BLD AUTO: 0.5 10*3/MM3 (ref 0.1–0.9)
MONOCYTES NFR BLD AUTO: 3.4 % (ref 5–12)
NEUTROPHILS NFR BLD AUTO: 11.91 10*3/MM3 (ref 1.7–7)
NEUTROPHILS NFR BLD AUTO: 80.9 % (ref 42.7–76)
PLATELET # BLD AUTO: 393 10*3/MM3 (ref 140–450)
PMV BLD AUTO: 10.2 FL (ref 6–12)
POTASSIUM SERPL-SCNC: 3.5 MMOL/L (ref 3.5–5.2)
PROT SERPL-MCNC: 8.9 G/DL (ref 6–8.5)
RBC # BLD AUTO: 4.87 10*6/MM3 (ref 3.77–5.28)
SODIUM SERPL-SCNC: 138 MMOL/L (ref 136–145)
WBC NRBC COR # BLD AUTO: 14.72 10*3/MM3 (ref 3.4–10.8)

## 2025-06-04 PROCEDURE — 99283 EMERGENCY DEPT VISIT LOW MDM: CPT

## 2025-06-04 PROCEDURE — 36415 COLL VENOUS BLD VENIPUNCTURE: CPT

## 2025-06-04 PROCEDURE — 84702 CHORIONIC GONADOTROPIN TEST: CPT

## 2025-06-04 PROCEDURE — 83605 ASSAY OF LACTIC ACID: CPT | Performed by: STUDENT IN AN ORGANIZED HEALTH CARE EDUCATION/TRAINING PROGRAM

## 2025-06-04 PROCEDURE — 80053 COMPREHEN METABOLIC PANEL: CPT

## 2025-06-04 PROCEDURE — 25010000002 LIDOCAINE 1% - EPINEPHRINE 1:100000 1 %-1:100000 SOLUTION

## 2025-06-04 PROCEDURE — 85025 COMPLETE CBC W/AUTO DIFF WBC: CPT

## 2025-06-04 RX ORDER — ACETAMINOPHEN 500 MG
1000 TABLET ORAL ONCE
Status: COMPLETED | OUTPATIENT
Start: 2025-06-04 | End: 2025-06-04

## 2025-06-04 RX ORDER — LIDOCAINE HYDROCHLORIDE AND EPINEPHRINE 10; 10 MG/ML; UG/ML
10 INJECTION, SOLUTION INFILTRATION; PERINEURAL ONCE
Status: COMPLETED | OUTPATIENT
Start: 2025-06-04 | End: 2025-06-04

## 2025-06-04 RX ADMIN — ACETAMINOPHEN 1000 MG: 500 TABLET, FILM COATED ORAL at 17:37

## 2025-06-04 RX ADMIN — AMOXICILLIN AND CLAVULANATE POTASSIUM 1 TABLET: 875; 125 TABLET, FILM COATED ORAL at 20:01

## 2025-06-04 RX ADMIN — LIDOCAINE HYDROCHLORIDE,EPINEPHRINE BITARTRATE 10 ML: 10; .01 INJECTION, SOLUTION INFILTRATION; PERINEURAL at 19:47

## 2025-06-04 NOTE — DISCHARGE INSTRUCTIONS
Please follow-up with your PCP and OB/GYN    Rest.  Increase fluid intake.   Although you are being discharged in the ED today, I encourage you to return for worsening symptoms.  Things can, and do, change such a treatment at home with medication may not be adequate.  Specifically I recommend returning for chest pain or discomfort, difficulty breathing, persistent vomiting or difficulty holding down liquids or medications, fever > 102.0 F, or any other worsening or alarming symptoms.     Take meds as prescribed. FINISH ALL ANTIBIOTICS AS PRESCRIBED.    You have been evaluated in the emergency department for your presenting symptoms and deemed appropriate for discharge home.  Understand that your health care does not end here today.  It is important that your primary care physician be made aware of your visit.  I recommend calling your primary care provider in the next 48 hours to arrange follow-up care.  Your primary care provider needs to review your treatment and recovery to ensure that no further treatment is necessary.  Additional testing or procedures may be necessary as an outpatient at the discretion of your primary care provider.    I also recommend following up with your PCP for recheck of your blood pressure and treatment as needed.

## 2025-06-04 NOTE — ED PROVIDER NOTES
EMERGENCY DEPARTMENT ENCOUNTER    Room Number:    Date of encounter:  2025  PCP: Provider, No Known  Historian: Patient  Full history not obtainable due to: None    HPI:  Chief Complaint: Abscess    Context: Azeb Keller is a 6 week pregnant  25 y.o. female with a PMH significant for anemia who presents to the ED c/o abscess x 2 days.  Patient was seen at an [facility] who sent patient to the ED with concerns for perineal abscess.  Patient notes that she initially went to the urgent care.  She has been having some soreness in her left buttock for the past 2 days and recently found that she was pregnant, when she went to the immediate care center they felt that she should go to the emergency department for drainage of her abscess.  Patient notes that she felt some chills last night but has not taken her temperature to see if she had a fever.  Denies any similar problems to this in the past.  Denies any vaginal bleeding, discharge, or abdominal pain.       MEDICAL RECORD REVIEW:    Upon review of the medical record it appears the patient was evaluated 24.  The patient had a normal progesterone and hCG.    PAST MEDICAL HISTORY    Active Ambulatory Problems     Diagnosis Date Noted    Maternal anemia in pregnancy, antepartum 2024     Resolved Ambulatory Problems     Diagnosis Date Noted    Pregnancy 2024     Past Medical History:   Diagnosis Date    Anemia          PAST SURGICAL HISTORY  Past Surgical History:   Procedure Laterality Date    EYE SURGERY           FAMILY HISTORY  No family history on file.      SOCIAL HISTORY  Social History     Socioeconomic History    Marital status:    Tobacco Use    Smoking status: Never    Smokeless tobacco: Never   Vaping Use    Vaping status: Never Used   Substance and Sexual Activity    Alcohol use: Never    Drug use: Never    Sexual activity: Yes         ALLERGIES  Patient has no known allergies.        REVIEW OF SYSTEMS    All systems reviewed and  marked as negative except as listed in HPI     PHYSICAL EXAM    I have reviewed the triage vital signs and nursing notes.    ED Triage Vitals   Temp Heart Rate Resp BP SpO2   06/04/25 1514 06/04/25 1514 06/04/25 1514 06/04/25 1516 06/04/25 1514   98.6 °F (37 °C) 118 16 139/82 99 %      Temp src Heart Rate Source Patient Position BP Location FiO2 (%)   06/04/25 1514 -- -- -- --   Oral           Physical Exam  Constitutional:       General: She is not in acute distress.     Appearance: Normal appearance. She is not ill-appearing.   HENT:      Head: Normocephalic and atraumatic.      Nose: Nose normal.   Eyes:      Extraocular Movements: Extraocular movements intact.      Pupils: Pupils are equal, round, and reactive to light.   Cardiovascular:      Rate and Rhythm: Regular rhythm. Tachycardia present.      Pulses: Normal pulses.      Heart sounds: Normal heart sounds.   Pulmonary:      Effort: Pulmonary effort is normal. No respiratory distress.      Breath sounds: Normal breath sounds.   Abdominal:      General: Abdomen is flat.      Palpations: Abdomen is soft.   Skin:     General: Skin is warm and dry.      Coloration: Skin is not jaundiced.          Neurological:      Mental Status: She is alert and oriented to person, place, and time.   Psychiatric:         Mood and Affect: Mood normal.         Behavior: Behavior normal.         Thought Content: Thought content normal.         Vital signs and nursing notes reviewed.        LAB RESULTS  Recent Results (from the past 24 hours)   Comprehensive Metabolic Panel    Collection Time: 06/04/25  5:42 PM    Specimen: Blood   Result Value Ref Range    Glucose 88 65 - 99 mg/dL    BUN 6.0 6.0 - 20.0 mg/dL    Creatinine 0.69 0.57 - 1.00 mg/dL    Sodium 138 136 - 145 mmol/L    Potassium 3.5 3.5 - 5.2 mmol/L    Chloride 104 98 - 107 mmol/L    CO2 21.1 (L) 22.0 - 29.0 mmol/L    Calcium 9.5 8.6 - 10.5 mg/dL    Total Protein 8.9 (H) 6.0 - 8.5 g/dL    Albumin 4.2 3.5 - 5.2 g/dL     ALT (SGPT) 14 1 - 33 U/L    AST (SGOT) 15 1 - 32 U/L    Alkaline Phosphatase 105 39 - 117 U/L    Total Bilirubin 0.3 0.0 - 1.2 mg/dL    Globulin 4.7 gm/dL    A/G Ratio 0.9 g/dL    BUN/Creatinine Ratio 8.7 7.0 - 25.0    Anion Gap 12.9 5.0 - 15.0 mmol/L    eGFR 122.2 >60.0 mL/min/1.73   hCG, Quantitative, Pregnancy    Collection Time: 06/04/25  5:42 PM    Specimen: Blood   Result Value Ref Range    HCG Quantitative 23,442.00 mIU/mL   CBC Auto Differential    Collection Time: 06/04/25  5:42 PM    Specimen: Blood   Result Value Ref Range    WBC 14.72 (H) 3.40 - 10.80 10*3/mm3    RBC 4.87 3.77 - 5.28 10*6/mm3    Hemoglobin 10.6 (L) 12.0 - 15.9 g/dL    Hematocrit 37.0 34.0 - 46.6 %    MCV 76.0 (L) 79.0 - 97.0 fL    MCH 21.8 (L) 26.6 - 33.0 pg    MCHC 28.6 (L) 31.5 - 35.7 g/dL    RDW 17.0 (H) 12.3 - 15.4 %    RDW-SD 46.0 37.0 - 54.0 fl    MPV 10.2 6.0 - 12.0 fL    Platelets 393 140 - 450 10*3/mm3    Neutrophil % 80.9 (H) 42.7 - 76.0 %    Lymphocyte % 14.4 (L) 19.6 - 45.3 %    Monocyte % 3.4 (L) 5.0 - 12.0 %    Eosinophil % 0.6 0.3 - 6.2 %    Basophil % 0.2 0.0 - 1.5 %    Immature Grans % 0.5 0.0 - 0.5 %    Neutrophils, Absolute 11.91 (H) 1.70 - 7.00 10*3/mm3    Lymphocytes, Absolute 2.12 0.70 - 3.10 10*3/mm3    Monocytes, Absolute 0.50 0.10 - 0.90 10*3/mm3    Eosinophils, Absolute 0.09 0.00 - 0.40 10*3/mm3    Basophils, Absolute 0.03 0.00 - 0.20 10*3/mm3    Immature Grans, Absolute 0.07 (H) 0.00 - 0.05 10*3/mm3   Lactic Acid, Plasma    Collection Time: 06/04/25  5:49 PM    Specimen: Blood   Result Value Ref Range    Lactate 0.9 0.5 - 2.0 mmol/L       Ordered the above labs and independently reviewed the results.        RADIOLOGY  No Radiology Exams Resulted Within Past 24 Hours    I ordered the above noted radiological studies. Independently reviewed by me and discussed with radiologist.  See dictation above for official radiology interpretation.      PROCEDURES    Incision & Drainage    Date/Time: 6/4/2025 7:50  PM    Performed by: Adrián Bishop PA-C  Authorized by: Yoshi Santizo MD    Consent:     Consent obtained:  Verbal    Consent given by:  Patient    Risks, benefits, and alternatives were discussed: yes      Risks discussed:  Bleeding, incomplete drainage, pain, damage to other organs and infection    Alternatives discussed:  No treatment and referral  Universal protocol:     Procedure explained and questions answered to patient or proxy's satisfaction: yes      Relevant documents present and verified: yes      Test results available : yes      Imaging studies available: yes      Required blood products, implants, devices, and special equipment available: yes      Site/side marked: yes      Immediately prior to procedure, a time out was called: yes      Patient identity confirmed:  Verbally with patient, arm band, provided demographic data and hospital-assigned identification number  Location:     Type:  Abscess    Location:  Anogenital    Anogenital location:  Perianal  Pre-procedure details:     Skin preparation:  Antiseptic wash and povidone-iodine  Sedation:     Sedation type:  None  Anesthesia:     Anesthesia method:  Local infiltration    Local anesthetic:  Lidocaine 1% WITH epi  Procedure type:     Complexity:  Complex  Procedure details:     Ultrasound guidance: no      Needle aspiration: no      Incision types:  Single straight    Wound management:  Probed and deloculated and extensive cleaning    Drainage:  Bloody and purulent    Drainage amount:  Moderate    Wound treatment:  Drain placed    Packing materials:  1/4 in iodoform gauze  Post-procedure details:     Procedure completion:  Tolerated          MEDICATIONS GIVEN IN ER    Medications   acetaminophen (TYLENOL) tablet 1,000 mg (1,000 mg Oral Given 6/4/25 6547)   lidocaine 1% - EPINEPHrine 1:307479 (XYLOCAINE W/EPI) 1 %-1:328014 injection 10 mL (10 mL Injection Given by Other 6/4/25 1947)   amoxicillin-clavulanate (AUGMENTIN) 875-125 MG  per tablet 1 tablet (1 tablet Oral Given 6/4/25 2001)         PROGRESS, DATA ANALYSIS, CONSULTS, AND MEDICAL DECISION MAKING    All labs have been independently interpreted by me.  All radiology studies have been interpreted by me.  Discussion below represents my analysis of pertinent findings related to patient's condition, differential diagnosis, treatment plan and final disposition.    Patient found to have perianal abscess.  At this time, patient did decline a CT scan.  Abscess was drained in the ED, packing was placed by myself, patient was assessed by OB at bedside, patient to follow-up outpatient with her OB/GYN team, given 1 dose of Augmentin today in the ED and sent home with prescription for Augmentin    - Chronic or social conditions impacting care: No PCP      DIFFERENTIAL DIAGNOSIS INCLUDE BUT NOT LIMITED TO: Perirectal abscess, perianal abscess, cellulitis      Orders placed during this visit:  Orders Placed This Encounter   Procedures    Incision & Drainage    Comprehensive Metabolic Panel    hCG, Quantitative, Pregnancy    CBC Auto Differential    Lactic Acid, Plasma    Supplies To Bedside - Notify MD When Ready- Iodoform Gauze, I&D Tray / Kit    OB/GYN (on-call MD unless specified)    CBC & Differential         ED Course as of 06/04/25 2010 Wed Jun 04, 2025   1711 Temp: 98.6 °F (37 °C) [CV]   1711 Heart Rate: 118 [CV]   1826 Consulted with Dr. Ruiz,  with OB/GYN    We discussed the patient's history and physical exam, along with pertinent lab and imaging findings.  From OB, patient is clear for CT.    [CV]   1827 WBC(!): 14.72 [DN]   1827 Lactate: 0.9 [DN]   1835 I discussed my conversation with OB/GYN with the patient.  Patient at this time still does not feel comfortable doing a CT scan of her abdomen pelvis.  We discussed the risks of this decision.  Current plan is to do I&D at bedside with packing, send patient home on Augmentin and follow-up with OB/GYN, patient says that she will return  for CT scan if this does not go away after drainage. [CV]   1921 Dr. Ruiz saw patient at bedside, agreed with plan of care [CV]   2010 Patient's pain felt much better after Tylenol.  Abscess drained at bedside with no immediate complications, packing placed, patient to follow-up with OB team later this week [CV]      ED Course User Index  [CV] Adrián Bishop PA-C  [DN] Yoshi Santizo MD       AS OF 20:10 EDT VITALS:    BP - 132/75  HR - 97  TEMP - 98.9 °F (37.2 °C) (Oral)  02 SATS - 99%    I rechecked the patient.  I discussed the patient's labs, radiology findings (including all incidental findings), diagnosis, and plan for discharge.  A repeat exam reveals no new worrisome changes from my initial exam findings.  The patient understands that the fact that they are being discharged does not denote that nothing is abnormal, it indicates that no clinical emergency is present and that they must follow-up as directed in order to properly maintain their health.  Follow-up instructions (specifically listed below) and return to ER precautions were given at this time.  I specifically instructed the patient to follow-up with their PCP.  The patient understands and agrees with the plan, and is ready for discharge.  All questions answered.    Shannan Gonzalez MD  [address] [place] [address] [address]  [place] [address]  [phone]    Go in 2 days      PATIENT CONNECTION - [place] [address]  [phone]  Call   To establish care with PCP         Medication List        New Prescriptions      amoxicillin-clavulanate 875-125 MG per tablet  Commonly known as: AUGMENTIN  Take 1 tablet by mouth 2 (Two) Times a Day for 7 days.               Where to Get Your Medications        These medications were sent to [id number] - [place], [place] - [address] AT [facility] & [address] - [phone] [facility] [phone] FX  [address] [facility] [address]      Phone:  [phone]   amoxicillin-clavulanate 875-125 MG per tablet             DIAGNOSIS  Final diagnoses:   Perianal abscess   Less than 8 weeks gestation of pregnancy   Leukocytosis, unspecified type         DISPOSITION  Discharge    Pt masked in first look. I wore a surgical mask throughout my encounters with the pt. I performed hand hygiene on entry into the pt room and upon exit.     Dictated utilizing Dragon dictation     Note Disclaimer: At [facility], we believe that sharing information builds trust and better relationships. You are receiving this note because you recently visited [facility]. It is possible you will see health information before a provider has talked with you about it. This kind of information can be easy to misunderstand. To help you fully understand what it means for your health, we urge you to discuss this note with your provider.      Adrián Bishop PA-C  06/04/25 2011

## 2025-06-04 NOTE — ED PROVIDER NOTES
MD ATTESTATION NOTE    The MESFIN and I have discussed this patient's history, physical exam, MDM, and treatment plan.  I have reviewed the documentation and personally had a face to face interaction with the patient. I affirm the documentation and agree with the treatment and plan.  The attached note describes my personal findings.      I provided a substantive portion of the medical decision making.        Brief HPI: 25-year-old female, G2, P1, approximately 6 weeks gestational age, presents to ED for evaluation of painful draining abscess right gluteal region for 2 days.  She was seen at a urgent care and sent here for further evaluation.  She states her OB doctor is with [facility].  No fever or chills, no history of diabetes.    PHYSICAL EXAM  ED Triage Vitals   Temp Heart Rate Resp BP SpO2   06/04/25 1514 06/04/25 1514 06/04/25 1514 06/04/25 1516 06/04/25 1514   98.6 °F (37 °C) 118 16 139/82 99 %      Temp src Heart Rate Source Patient Position BP Location FiO2 (%)   06/04/25 1514 -- -- -- --   Oral             GENERAL: no acute distress  HENT: nares patent  EYES: no scleral icterus  CV: regular rhythm, tachycardia  RESPIRATORY: normal effort  ABDOMEN: soft, patient with spontaneously draining abscess right inferior gluteal region with surrounding fluctuance, foul-smelling  MUSCULOSKELETAL: no deformity  NEURO: alert, moves all extremities, follows commands  PSYCH:  calm, cooperative  SKIN: warm, dry    Vital signs and nursing notes reviewed.        Medical Decision Making:  ED Course as of 06/04/25 2023 Wed Jun 04, 2025 1711 Temp: 98.6 °F (37 °C) [CV]   1711 Heart Rate: 118 [CV]   1826 Consulted with Dr. Ruiz,  with OB/GYN    We discussed the patient's history and physical exam, along with pertinent lab and imaging findings.  From OB, patient is clear for CT.    [CV]   1827 WBC(!): 14.72 [DN]   1827 Lactate: 0.9 [DN]   1835 I discussed my conversation with OB/GYN with the patient.  Patient at this time  still does not feel comfortable doing a CT scan of her abdomen pelvis.  We discussed the risks of this decision.  Current plan is to do I&D at bedside with packing, send patient home on Augmentin and follow-up with OB/GYN, patient says that she will return for CT scan if this does not go away after drainage. [CV]   1921 Dr. Ruiz saw patient at bedside, agreed with plan of care [CV]   2010 Patient's pain felt much better after Tylenol.  Abscess drained at bedside with no immediate complications, packing placed, patient to follow-up with OB team later this week [CV]      ED Course User Index  [CV] Adrián Bishop PA-C  [DN] Yoshi Santizo MD        Diagnosis Plan   1. Perineal abscess        2. Less than 8 weeks gestation of pregnancy        3. Leukocytosis, unspecified type            ED Disposition       ED Disposition   Discharge    Condition   Stable    Comment   --                  Yoshi Santizo MD  06/04/25 1740       Yoshi Santizo MD  06/04/25 2021       Yoshi Santizo MD  06/04/25 2023

## 2025-06-10 ENCOUNTER — TELEPHONE (OUTPATIENT)
Dept: OBSTETRICS AND GYNECOLOGY | Facility: CLINIC | Age: 25
End: 2025-06-10
Payer: MEDICAID

## 2025-06-10 NOTE — TELEPHONE ENCOUNTER
Molina, I can see her Monday and I'm fine if she has an ultrasound on Monday and sees me afterwards for her new OB visit and I can also follow-up regarding her abscess.

## 2025-06-10 NOTE — TELEPHONE ENCOUNTER
NEHEMIAH pt, r/s'd New Ob to 6/16/25 at 1:00 for u/s and 1:30 w/Dr. Verma for initial prenatal & f/u to ED for abscess.

## 2025-06-10 NOTE — TELEPHONE ENCOUNTER
Est New Ob (LMP 4/22) scheduled for 7/16/25, called report she was seen at  ED on 6/4/25 for evaluation of painful draining abscess right gluteal region for 2 days.  States she was prescribed abx and they are helping.  Was advised to schedule appt w/you for f/u.      I assume that should be a gy f/u to ED, not a pregnancy type of visit?    Your first available gy f/u is 7/1/25-may I use an ob spot at [facility] this coming Monday 6/16?    Pt also asking if we can add an u/s to her New Ob appt on 7/16, or may she have it done sooner?    Please advise on scheduling.     Thanks,   Mansi    Pt # [phone]

## 2025-06-16 ENCOUNTER — INITIAL PRENATAL (OUTPATIENT)
Dept: OBSTETRICS AND GYNECOLOGY | Facility: CLINIC | Age: 25
End: 2025-06-16
Payer: MEDICAID

## 2025-06-16 VITALS — SYSTOLIC BLOOD PRESSURE: 111 MMHG | BODY MASS INDEX: 32.2 KG/M2 | WEIGHT: 205.6 LBS | DIASTOLIC BLOOD PRESSURE: 70 MMHG

## 2025-06-16 DIAGNOSIS — Z34.90 EARLY STAGE OF PREGNANCY: Primary | ICD-10-CM

## 2025-06-16 DIAGNOSIS — N76.4 VULVAR ABSCESS: ICD-10-CM

## 2025-06-16 DIAGNOSIS — Z13.89 SCREENING FOR BLOOD OR PROTEIN IN URINE: ICD-10-CM

## 2025-06-16 PROBLEM — O99.019 MATERNAL ANEMIA IN PREGNANCY, ANTEPARTUM: Status: RESOLVED | Noted: 2024-04-30 | Resolved: 2025-06-16

## 2025-06-16 LAB
GLUCOSE UR STRIP-MCNC: NEGATIVE MG/DL
PROT UR STRIP-MCNC: ABNORMAL MG/DL

## 2025-06-16 NOTE — PROGRESS NOTES
Chief Complaint   Patient presents with    Initial Prenatal Visit     HPI- Pt is 25 y.o.  at 7w6d here for prenatal visit.  Patient presents for initial OB visit.  Ultrasound today confirms 7-week 6-day intrauterine pregnancy, which is consistent with her LMP.  She has a history of 1 term vaginal delivery in November of last year and had no complications in that pregnancy.  Patient was seen 2 weeks ago in the emergency department for a labial abscess and had an I&D performed.  She was started on Augmentin and states she did complete the entire antibiotic course.  She states that there is packing still in place in the previous abscess.  She has no major pregnancy related complaints today and is taking prenatal vitamins.    ROS-     - No vaginal bleeding    GI- No abdominal pain    /70   Wt 93.3 kg (205 lb 9.6 oz)   LMP 2025   BMI 32.20 kg/m²   Exam - See flow sheet    Fetal heart rate is normal    Assessment-  Diagnoses and all orders for this visit:    Early stage of pregnancy  -     OB Panel With HIV and Treponema Palldium Ab  -     Urine Culture - Urine, Urine, Clean Catch  -     Drug Profile Urine - 9 Drugs - Urine, Clean Catch  -     Chlamydia trachomatis, Neisseria gonorrhoeae, Trichomonas vaginalis, PCR - Swab, Vagina    Vulvar abscess    Packing was removed from the site of the I&D.  An open wound is noted where the I&D was performed.  There is no puslike drainage and no surrounding erythema.  Patient was advised to do sitz bath's 1-2 times per day and allow this area to heal by secondary intention.  She will see me back in 2 weeks.  She was advised to call sooner with any fevers or worsening of symptoms.  Initial OB labs were ordered today.

## 2025-06-17 LAB
ACCEPTABLE CREAT UR QL: 231.4 MG/DL (ref 20–300)
AMPHETAMINES UR QL SCN: NEGATIVE NG/ML
BARBITURATES UR QL SCN: NEGATIVE NG/ML
BENZODIAZ UR QL: NEGATIVE NG/ML
BZE UR QL: NEGATIVE NG/ML
CANNABINOIDS UR QL SCN: NEGATIVE NG/ML
METHADONE UR QL SCN: NEGATIVE NG/ML
NITRITE UR QL STRIP: NEGATIVE MCG/ML
OPIATES UR QL SCN: NEGATIVE NG/ML
PCP UR QL SCN: NEGATIVE NG/ML
PH UR: 6.5 [PH] (ref 4.5–8.9)
PROPOXYPH UR QL SCN: NEGATIVE NG/ML

## 2025-06-18 LAB
ABO GROUP BLD: ABNORMAL
BACTERIA UR CULT: NORMAL
BACTERIA UR CULT: NORMAL
BASOPHILS # BLD AUTO: 0.1 X10E3/UL (ref 0–0.2)
BASOPHILS NFR BLD AUTO: 1 %
BLD GP AB SCN SERPL QL: NEGATIVE
C TRACH RRNA SPEC QL NAA+PROBE: NEGATIVE
EOSINOPHIL # BLD AUTO: 0.2 X10E3/UL (ref 0–0.4)
EOSINOPHIL NFR BLD AUTO: 2 %
ERYTHROCYTE [DISTWIDTH] IN BLOOD BY AUTOMATED COUNT: 16.9 % (ref 11.7–15.4)
HBV SURFACE AG SERPL QL IA: NEGATIVE
HCT VFR BLD AUTO: 35.3 % (ref 34–46.6)
HCV AB SERPL QL IA: NON REACTIVE
HCV AB SERPL QL IA: NORMAL
HGB BLD-MCNC: 10 G/DL (ref 11.1–15.9)
HIV 1+2 AB+HIV1 P24 AG SERPL QL IA: NON REACTIVE
IMM GRANULOCYTES # BLD AUTO: 0.1 X10E3/UL (ref 0–0.1)
IMM GRANULOCYTES NFR BLD AUTO: 1 %
IMP & REVIEW OF LAB RESULTS: ABNORMAL
LYMPHOCYTES # BLD AUTO: 2 X10E3/UL (ref 0.7–3.1)
LYMPHOCYTES NFR BLD AUTO: 19 %
MCH RBC QN AUTO: 21.7 PG (ref 26.6–33)
MCHC RBC AUTO-ENTMCNC: 28.3 G/DL (ref 31.5–35.7)
MCV RBC AUTO: 77 FL (ref 79–97)
MONOCYTES # BLD AUTO: 0.4 X10E3/UL (ref 0.1–0.9)
MONOCYTES NFR BLD AUTO: 4 %
N GONORRHOEA RRNA SPEC QL NAA+PROBE: NEGATIVE
NEUTROPHILS # BLD AUTO: 7.7 X10E3/UL (ref 1.4–7)
NEUTROPHILS NFR BLD AUTO: 73 %
PLATELET # BLD AUTO: 373 X10E3/UL (ref 150–450)
RBC # BLD AUTO: 4.61 X10E6/UL (ref 3.77–5.28)
RH BLD: POSITIVE
RUBV IGG SERPL IA-ACNC: 3.04 INDEX
T VAGINALIS RRNA SPEC QL NAA+PROBE: NEGATIVE
TREPONEMA PALLIDUM IGG+IGM AB [PRESENCE] IN SERUM OR PLASMA BY IMMUNOASSAY: NON REACTIVE
WBC # BLD AUTO: 10.5 X10E3/UL (ref 3.4–10.8)

## 2025-06-23 PROBLEM — O99.019 MATERNAL ANEMIA IN PREGNANCY, ANTEPARTUM: Status: ACTIVE | Noted: 2025-06-23

## 2025-06-30 ENCOUNTER — ROUTINE PRENATAL (OUTPATIENT)
Dept: OBSTETRICS AND GYNECOLOGY | Facility: CLINIC | Age: 25
End: 2025-06-30
Payer: COMMERCIAL

## 2025-06-30 VITALS — DIASTOLIC BLOOD PRESSURE: 74 MMHG | WEIGHT: 204 LBS | SYSTOLIC BLOOD PRESSURE: 136 MMHG | BODY MASS INDEX: 31.95 KG/M2

## 2025-06-30 DIAGNOSIS — O21.9 NAUSEA AND VOMITING DURING PREGNANCY: ICD-10-CM

## 2025-06-30 DIAGNOSIS — O99.019 MATERNAL ANEMIA IN PREGNANCY, ANTEPARTUM: Primary | ICD-10-CM

## 2025-06-30 DIAGNOSIS — Z36.0 ENCOUNTER FOR ANTENATAL SCREENING FOR CHROMOSOMAL ANOMALIES: ICD-10-CM

## 2025-06-30 DIAGNOSIS — Z3A.09 9 WEEKS GESTATION OF PREGNANCY: ICD-10-CM

## 2025-06-30 PROCEDURE — 0502F SUBSEQUENT PRENATAL CARE: CPT | Performed by: OBSTETRICS & GYNECOLOGY

## 2025-06-30 RX ORDER — ONDANSETRON 4 MG/1
4 TABLET, ORALLY DISINTEGRATING ORAL EVERY 8 HOURS PRN
Qty: 20 TABLET | Refills: 2 | Status: SHIPPED | OUTPATIENT
Start: 2025-06-30

## 2025-06-30 NOTE — PROGRESS NOTES
Chief Complaint   Patient presents with    Routine Prenatal Visit     HPI- Pt is 25 y.o.  at 9w6d here for prenatal visit.  Patient has started having more nausea and vomiting since her last visit and is requesting antinausea medication.  She states that the vulvar abscess has completely healed and she is not having any pain at the area and states that it has closed.    ROS-     - No vaginal bleeding    GI- No abdominal pain    /74   Wt 92.5 kg (204 lb)   LMP 2025   BMI 31.95 kg/m²   Exam - See flow sheet    Fetal heart rate is normal    Assessment-  Diagnoses and all orders for this visit:    Maternal anemia in pregnancy, antepartum    9 weeks gestation of pregnancy    Nausea and vomiting during pregnancy  -     ondansetron ODT (ZOFRAN-ODT) 4 MG disintegrating tablet; Take 1 tablet by mouth Every 8 (Eight) Hours As Needed for Nausea or Vomiting.    Encounter for  screening for chromosomal anomalies  -     [id number] PLUS Core+SCA+ESS - Blood,    Patient reports she is taking prenatals and iron for her anemia.  Prescription was sent for Zofran.  Fetal heartbeat was seen with the V-scan ultrasound.  She desires NIPT.  She will follow-up in 4 weeks.

## 2025-07-04 LAB
5P15 DELETION (CRI-DU-CHAT): NOT DETECTED
CFDNA.FET/CFDNA.TOTAL SFR FETUS: NORMAL %
CITATION REF LAB TEST: NORMAL
FET 13+18+21+X+Y ANEUP PLAS.CFDNA: NEGATIVE
FET 1P36 DEL RISK WBC.DNA+CFDNA QL: NOT DETECTED
FET 22Q11.2 DEL RISK WBC.DNA+CFDNA QL: NOT DETECTED
FET CHR 11Q23 DEL PLAS.CFDNA QL: NOT DETECTED
FET CHR 15Q11 DEL PLAS.CFDNA QL: NOT DETECTED
FET CHR 21 TS PLAS.CFDNA QL: NEGATIVE
FET CHR 4P16 DEL PLAS.CFDNA QL: NOT DETECTED
FET CHR 8Q24 DEL PLAS.CFDNA QL: NOT DETECTED
FET MS X RISK WBC.DNA+CFDNA QL: NOT DETECTED
FET SEX PLAS.CFDNA DOSAGE CFDNA: NORMAL
FET TS 13 RISK PLAS.CFDNA QL: NEGATIVE
FET TS 18 RISK WBC.DNA+CFDNA QL: NEGATIVE
FET X + Y ANEUP RISK PLAS.CFDNA SEQ-IMP: NOT DETECTED
GA EST FROM CONCEPTION DATE: NORMAL D
GESTATIONAL AGE > 9:: YES
LAB DIRECTOR NAME PROVIDER: NORMAL
LAB DIRECTOR NAME PROVIDER: NORMAL
LABORATORY COMMENT REPORT: NORMAL
LIMITATIONS OF THE TEST: NORMAL
NEGATIVE PREDICTIVE VALUE: NORMAL
PERFORMANCE CHARACTERISTICS: NORMAL
POSITIVE PREDICTIVE VALUE: NORMAL
REF LAB TEST METHOD: NORMAL
SERVICE CMNT-IMP: NORMAL
TEST PERFORMANCE INFO SPEC: NORMAL
TRIOSOMY 16: NOT DETECTED
TRISOMY 22: NOT DETECTED

## 2025-07-07 ENCOUNTER — RESULTS FOLLOW-UP (OUTPATIENT)
Dept: OBSTETRICS AND GYNECOLOGY | Facility: CLINIC | Age: 25
End: 2025-07-07
Payer: COMMERCIAL

## 2025-07-28 ENCOUNTER — ROUTINE PRENATAL (OUTPATIENT)
Dept: OBSTETRICS AND GYNECOLOGY | Facility: CLINIC | Age: 25
End: 2025-07-28
Payer: MEDICAID

## 2025-07-28 VITALS — WEIGHT: 198 LBS | DIASTOLIC BLOOD PRESSURE: 71 MMHG | BODY MASS INDEX: 31.01 KG/M2 | SYSTOLIC BLOOD PRESSURE: 110 MMHG

## 2025-07-28 DIAGNOSIS — O99.019 MATERNAL ANEMIA IN PREGNANCY, ANTEPARTUM: Primary | ICD-10-CM

## 2025-07-28 DIAGNOSIS — Z3A.13 13 WEEKS GESTATION OF PREGNANCY: ICD-10-CM

## 2025-07-28 DIAGNOSIS — Z13.89 SCREENING FOR BLOOD OR PROTEIN IN URINE: ICD-10-CM

## 2025-07-28 LAB
GLUCOSE UR STRIP-MCNC: NEGATIVE MG/DL
PROT UR STRIP-MCNC: NEGATIVE MG/DL

## 2025-07-28 PROCEDURE — 99213 OFFICE O/P EST LOW 20 MIN: CPT | Performed by: OBSTETRICS & GYNECOLOGY

## 2025-07-28 NOTE — PROGRESS NOTES
Chief Complaint   Patient presents with    Routine Prenatal Visit     HPI- Pt is 25 y.o.  at 13w6d here for prenatal visit.  Patient is starting to notice some pelvic pressure.    ROS-     - No vaginal bleeding    GI- No abdominal pain    /71   Wt 89.8 kg (198 lb)   LMP 2025   BMI 31.01 kg/m²   Exam - See flow sheet    Fetal heart rate is normal    Assessment-  Diagnoses and all orders for this visit:    Maternal anemia in pregnancy, antepartum    13 weeks gestation of pregnancy    She was advised on the belly support band.  She had normal NIPT.  She will follow-up in 4 weeks for next prenatal visit.

## 2025-08-25 ENCOUNTER — ROUTINE PRENATAL (OUTPATIENT)
Dept: OBSTETRICS AND GYNECOLOGY | Facility: CLINIC | Age: 25
End: 2025-08-25
Payer: COMMERCIAL

## 2025-08-25 VITALS — WEIGHT: 199.8 LBS | BODY MASS INDEX: 31.29 KG/M2 | SYSTOLIC BLOOD PRESSURE: 131 MMHG | DIASTOLIC BLOOD PRESSURE: 73 MMHG

## 2025-08-25 DIAGNOSIS — Z36.86 ENCOUNTER FOR ANTENATAL SCREENING FOR CERVICAL LENGTH: ICD-10-CM

## 2025-08-25 DIAGNOSIS — Z36.1 ENCOUNTER FOR ANTENATAL SCREENING FOR HIGH ALPHA-FETOPROTEIN LEVEL: ICD-10-CM

## 2025-08-25 DIAGNOSIS — Z36.89 ENCOUNTER FOR FETAL ANATOMIC SURVEY: ICD-10-CM

## 2025-08-25 DIAGNOSIS — O99.019 MATERNAL ANEMIA IN PREGNANCY, ANTEPARTUM: Primary | ICD-10-CM

## 2025-08-25 DIAGNOSIS — Z3A.17 17 WEEKS GESTATION OF PREGNANCY: ICD-10-CM

## 2025-08-25 PROCEDURE — 0502F SUBSEQUENT PRENATAL CARE: CPT | Performed by: OBSTETRICS & GYNECOLOGY

## 2025-08-27 LAB
AFP INTERP SERPL-IMP: NORMAL
AFP INTERP SERPL-IMP: NORMAL
AFP MOM SERPL: 1.52
AFP SERPL QL: NORMAL
AFP SERPL-MCNC: 54.6 NG/ML
AGE AT DELIVERY: 28 YR
GA METHOD: NORMAL
GA: 17.9 WEEKS
IDDM PATIENT QL: NO
MULTIPLE PREGNANCY: NO
NEURAL TUBE DEFECT RISK FETUS: 2591 %
SERVICE CMNT-IMP: NORMAL

## (undated) PROCEDURE — 2Y44X5Z PACKING OF FEMALE GENITAL TRACT USING PACKING MATERIAL: ICD-10-PCS | Performed by: OBSTETRICS & GYNECOLOGY